# Patient Record
Sex: FEMALE | Race: WHITE | Employment: FULL TIME | URBAN - METROPOLITAN AREA
[De-identification: names, ages, dates, MRNs, and addresses within clinical notes are randomized per-mention and may not be internally consistent; named-entity substitution may affect disease eponyms.]

---

## 2017-01-25 ENCOUNTER — TELEPHONE (OUTPATIENT)
Dept: FAMILY MEDICINE CLINIC | Facility: CLINIC | Age: 57
End: 2017-01-25

## 2017-01-25 NOTE — TELEPHONE ENCOUNTER
Please note/advise. Thank you. Pt wanted to let Dr. Cedrick Martell know that she stopped drinking caffeine as instructed and that the pain to her left breast pain near nipple area is gone.     Pt states that she is still having Intermittent tenderness pain to th

## 2017-01-25 NOTE — TELEPHONE ENCOUNTER
Pt calling to follow up with a nurse or Dr. Amy Partida regarding how she is doing with new diet change. No other information was given,  Please advise.

## 2017-01-26 NOTE — TELEPHONE ENCOUNTER
Pt informed of [facility] note as stated below. Pt verbalized understanding/compliance.  No questions/concerns

## 2017-01-26 NOTE — TELEPHONE ENCOUNTER
Okay, continue off caffeine and call in 1 week with update regarding outer breast pain.   If it persists we will get diagnostic mammogram.

## 2017-02-06 ENCOUNTER — TELEPHONE (OUTPATIENT)
Dept: FAMILY MEDICINE CLINIC | Facility: CLINIC | Age: 57
End: 2017-02-06

## 2017-02-06 DIAGNOSIS — N64.4 BILATERAL MASTODYNIA: Primary | ICD-10-CM

## 2017-02-06 NOTE — TELEPHONE ENCOUNTER
Please let patient know I have entered order for bilateral diagnostic mammogram.  Please obtain this study.

## 2017-02-06 NOTE — TELEPHONE ENCOUNTER
Reason for Call/Chief Complaint: breast pain  Onset: about a month ago  Nursing Assessment/Associated Symptoms:  pt states she had been advised to eliminate caffeine in diet. Pt states she sometimes feels twinges or achiness in both breasts.  Sharp pain in

## 2017-02-07 NOTE — TELEPHONE ENCOUNTER
Pt returned call. Informed her doctor placed a diagnostic mammogram order for her. Pt states she will  order tomorrow as she had mammo done through [facility]. Pt had no further questions at this time.

## 2017-02-21 ENCOUNTER — TELEPHONE (OUTPATIENT)
Dept: FAMILY MEDICINE CLINIC | Facility: CLINIC | Age: 57
End: 2017-02-21

## 2017-04-12 NOTE — TELEPHONE ENCOUNTER
Patient is calling to request refill for medication listed below. Please advise.      Mometasone Furoate (ASMANEX 120 METERED DOSES) 220 MCG/INH Inhalation Aerosol Powder, Breath Activated

## 2017-04-12 NOTE — TELEPHONE ENCOUNTER
Pt requesting Asmanex refill    Med listed as therapy completed in med profile  Last OV 12/29/16    Please advise

## 2017-07-01 PROBLEM — Z83.71 FAMILY HISTORY OF COLONIC POLYPS: Status: ACTIVE | Noted: 2017-07-01

## 2017-07-01 PROBLEM — Z83.719 FAMILY HISTORY OF COLONIC POLYPS: Status: ACTIVE | Noted: 2017-07-01

## 2017-07-06 ENCOUNTER — TELEPHONE (OUTPATIENT)
Dept: FAMILY MEDICINE CLINIC | Facility: CLINIC | Age: 57
End: 2017-07-06

## 2017-07-06 RX ORDER — HYDROCHLOROTHIAZIDE 25 MG/1
25 TABLET ORAL DAILY
Qty: 90 TABLET | Refills: 0 | Status: SHIPPED | OUTPATIENT
Start: 2017-07-06 | End: 2017-10-10 | Stop reason: ALTCHOICE

## 2017-07-06 NOTE — TELEPHONE ENCOUNTER
Please let her know I have sent to pharmacy. Maintain high potassium diet while taking this medication.   Make follow-up appointment with me in 4-6 weeks, bring record of home blood pressures at that time

## 2017-07-06 NOTE — TELEPHONE ENCOUNTER
Actions Requested: requesting hydrochlorothiazide  Problem: high blood pressure  Onset and Timing: last 1 week  Associated Symptoms: neck pain, slight headache.   Aggravating by: stress  Alleviated by:   Triage Note:  Patient stated that for the past 1 week

## 2017-10-10 ENCOUNTER — APPOINTMENT (OUTPATIENT)
Dept: LAB | Age: 57
End: 2017-10-10
Attending: FAMILY MEDICINE
Payer: COMMERCIAL

## 2017-10-10 ENCOUNTER — OFFICE VISIT (OUTPATIENT)
Dept: FAMILY MEDICINE CLINIC | Facility: CLINIC | Age: 57
End: 2017-10-10

## 2017-10-10 VITALS
HEIGHT: 65.39 IN | BODY MASS INDEX: 23.01 KG/M2 | TEMPERATURE: 98 F | SYSTOLIC BLOOD PRESSURE: 136 MMHG | WEIGHT: 139.81 LBS | DIASTOLIC BLOOD PRESSURE: 83 MMHG | RESPIRATION RATE: 16 BRPM | HEART RATE: 72 BPM

## 2017-10-10 DIAGNOSIS — Z12.31 ENCOUNTER FOR SCREENING MAMMOGRAM FOR BREAST CANCER: ICD-10-CM

## 2017-10-10 DIAGNOSIS — Z00.00 ROUTINE PHYSICAL EXAMINATION: Primary | ICD-10-CM

## 2017-10-10 DIAGNOSIS — G47.09 OTHER INSOMNIA: ICD-10-CM

## 2017-10-10 DIAGNOSIS — J45.20 MILD INTERMITTENT ASTHMA WITHOUT COMPLICATION: ICD-10-CM

## 2017-10-10 DIAGNOSIS — I10 HYPERTENSION, BENIGN: ICD-10-CM

## 2017-10-10 DIAGNOSIS — Z00.00 ROUTINE PHYSICAL EXAMINATION: ICD-10-CM

## 2017-10-10 DIAGNOSIS — Z83.71 FAMILY HISTORY OF COLONIC POLYPS: ICD-10-CM

## 2017-10-10 DIAGNOSIS — R92.2 DENSE BREASTS: ICD-10-CM

## 2017-10-10 DIAGNOSIS — E28.39 ESTROGEN DEFICIENCY: ICD-10-CM

## 2017-10-10 PROCEDURE — 36415 COLL VENOUS BLD VENIPUNCTURE: CPT

## 2017-10-10 PROCEDURE — 99396 PREV VISIT EST AGE 40-64: CPT | Performed by: FAMILY MEDICINE

## 2017-10-10 PROCEDURE — 80061 LIPID PANEL: CPT

## 2017-10-10 PROCEDURE — 86803 HEPATITIS C AB TEST: CPT

## 2017-10-10 PROCEDURE — 80048 BASIC METABOLIC PNL TOTAL CA: CPT

## 2017-10-10 RX ORDER — DIAZEPAM 5 MG/1
5 TABLET ORAL EVERY 6 HOURS PRN
Qty: 20 TABLET | Refills: 0 | Status: SHIPPED | OUTPATIENT
Start: 2017-10-10 | End: 2020-05-28

## 2017-10-10 NOTE — PROGRESS NOTES
HPI:    Patient ID: Mirna Blanco is a 61year old female. HPI    Review of Systems   Constitutional: Negative. Respiratory: Negative. Cardiovascular: Negative. Gastrointestinal: Negative. Skin: Negative. Neurological: Negative. done today. Pap smear normal 2015, colonoscopy due 1/2018, discussed mammogram options with history of heterogeneously dense breasts recommend mammogram Ramiro synthesis.   Family history of osteoporosis recommend bone density    Intermittent asthma– symptom

## 2017-11-06 ENCOUNTER — OFFICE VISIT (OUTPATIENT)
Dept: FAMILY MEDICINE CLINIC | Facility: CLINIC | Age: 57
End: 2017-11-06

## 2017-11-06 VITALS
OXYGEN SATURATION: 98 % | SYSTOLIC BLOOD PRESSURE: 149 MMHG | BODY MASS INDEX: 23 KG/M2 | HEART RATE: 63 BPM | TEMPERATURE: 97 F | RESPIRATION RATE: 16 BRPM | DIASTOLIC BLOOD PRESSURE: 83 MMHG | WEIGHT: 140 LBS

## 2017-11-06 DIAGNOSIS — R07.89 OTHER CHEST PAIN: Primary | ICD-10-CM

## 2017-11-06 PROCEDURE — 99213 OFFICE O/P EST LOW 20 MIN: CPT | Performed by: FAMILY MEDICINE

## 2017-11-06 PROCEDURE — 93000 ELECTROCARDIOGRAM COMPLETE: CPT | Performed by: FAMILY MEDICINE

## 2017-11-06 PROCEDURE — 93005 ELECTROCARDIOGRAM TRACING: CPT | Performed by: FAMILY MEDICINE

## 2017-11-06 PROCEDURE — 99212 OFFICE O/P EST SF 10 MIN: CPT | Performed by: FAMILY MEDICINE

## 2017-11-06 NOTE — PROGRESS NOTES
HPI: Charanjit Galvan is a 61year old female who presents for chest pain since this morning. Pt reports she woke up with it this morning. Described as excruciating. She had some shortness of breath. Pain radiated into the back. Has gotten slightly better.  Has no

## 2017-11-25 ENCOUNTER — TELEPHONE (OUTPATIENT)
Dept: OTHER | Age: 57
End: 2017-11-25

## 2017-11-25 ENCOUNTER — HOSPITAL ENCOUNTER (OUTPATIENT)
Age: 57
Discharge: HOME | End: 2017-11-25
Attending: EMERGENCY MEDICINE
Payer: COMMERCIAL

## 2017-11-25 ENCOUNTER — APPOINTMENT (OUTPATIENT)
Dept: GENERAL RADIOLOGY | Age: 57
End: 2017-11-25
Attending: EMERGENCY MEDICINE
Payer: COMMERCIAL

## 2017-11-25 VITALS
SYSTOLIC BLOOD PRESSURE: 140 MMHG | HEIGHT: 66 IN | DIASTOLIC BLOOD PRESSURE: 89 MMHG | OXYGEN SATURATION: 100 % | WEIGHT: 140 LBS | RESPIRATION RATE: 12 BRPM | BODY MASS INDEX: 22.5 KG/M2 | HEART RATE: 63 BPM | TEMPERATURE: 98 F

## 2017-11-25 DIAGNOSIS — S93.601A SPRAIN OF RIGHT FOOT, INITIAL ENCOUNTER: Primary | ICD-10-CM

## 2017-11-25 PROCEDURE — 73630 X-RAY EXAM OF FOOT: CPT | Performed by: EMERGENCY MEDICINE

## 2017-11-25 PROCEDURE — 99203 OFFICE O/P NEW LOW 30 MIN: CPT

## 2017-11-25 PROCEDURE — 99213 OFFICE O/P EST LOW 20 MIN: CPT

## 2017-11-25 NOTE — ED PROVIDER NOTES
Patient Seen in: [facility]    History   Patient presents with:   Foot Injury    Stated Complaint: Rt foot pain    HPI    The patient is a 53-year-old female who presents with complaints of lateral right foot pain which w plantar surface    ED Course   Labs Reviewed - No data to display    ED Course as of Nov 25 1434  ------------------------------------------------------------       [facility]     X-rays negative.   Will treat as a foot sprain and have the patient follow-up with h

## 2017-11-25 NOTE — ED INITIAL ASSESSMENT (HPI)
Approximately one week ago, patient rolled her right ankle while on a stepstool. She has been icing and elevating but today pain appears worse.

## 2017-11-25 NOTE — TELEPHONE ENCOUNTER
Pt stts injured right foot 1 week ago. It got better but now started hurting again after shopping all day yesterday. Slightly swollen. Pain level 4 when she stretches it. No appts available.  I advised pt to ice her foot and  go to urgent care for evaluatio

## 2017-11-27 NOTE — TELEPHONE ENCOUNTER
[facility] FU: Pt seen in [address] [place] [address] 11/25/17 for sprained right foot. No answer at [facility].

## 2017-12-07 ENCOUNTER — OFFICE VISIT (OUTPATIENT)
Dept: FAMILY MEDICINE CLINIC | Facility: CLINIC | Age: 57
End: 2017-12-07

## 2017-12-07 VITALS
WEIGHT: 141.63 LBS | BODY MASS INDEX: 23.31 KG/M2 | SYSTOLIC BLOOD PRESSURE: 123 MMHG | DIASTOLIC BLOOD PRESSURE: 77 MMHG | RESPIRATION RATE: 17 BRPM | HEIGHT: 65.39 IN | HEART RATE: 81 BPM | TEMPERATURE: 98 F

## 2017-12-07 DIAGNOSIS — S96.911A STRAIN OF RIGHT ANKLE, INITIAL ENCOUNTER: Primary | ICD-10-CM

## 2017-12-07 PROCEDURE — 99212 OFFICE O/P EST SF 10 MIN: CPT | Performed by: FAMILY MEDICINE

## 2017-12-07 NOTE — PROGRESS NOTES
HPI:    Patient ID: Li Ball is a 61year old female. Foot Injury    The incident occurred more than 1 week ago. The incident occurred at home. The injury mechanism was an inversion injury. The pain is present in the right foot.  The pain is moderat

## 2018-04-20 ENCOUNTER — HOSPITAL ENCOUNTER (OUTPATIENT)
Dept: MAMMOGRAPHY | Age: 58
Discharge: HOME | End: 2018-04-20
Attending: FAMILY MEDICINE
Payer: COMMERCIAL

## 2018-04-20 DIAGNOSIS — Z12.31 ENCOUNTER FOR SCREENING MAMMOGRAM FOR BREAST CANCER: ICD-10-CM

## 2018-04-20 DIAGNOSIS — R92.2 DENSE BREASTS: ICD-10-CM

## 2018-04-20 PROCEDURE — 77067 SCR MAMMO BI INCL CAD: CPT | Performed by: FAMILY MEDICINE

## 2018-04-20 PROCEDURE — 77063 BREAST TOMOSYNTHESIS BI: CPT | Performed by: FAMILY MEDICINE

## 2018-04-25 ENCOUNTER — APPOINTMENT (OUTPATIENT)
Dept: GENERAL RADIOLOGY | Age: 58
End: 2018-04-25
Attending: FAMILY MEDICINE
Payer: COMMERCIAL

## 2018-04-25 ENCOUNTER — HOSPITAL ENCOUNTER (OUTPATIENT)
Age: 58
Discharge: HOME | End: 2018-04-25
Attending: FAMILY MEDICINE
Payer: COMMERCIAL

## 2018-04-25 ENCOUNTER — NURSE TRIAGE (OUTPATIENT)
Dept: OTHER | Age: 58
End: 2018-04-25

## 2018-04-25 VITALS
OXYGEN SATURATION: 100 % | HEART RATE: 74 BPM | HEIGHT: 66 IN | RESPIRATION RATE: 16 BRPM | DIASTOLIC BLOOD PRESSURE: 83 MMHG | SYSTOLIC BLOOD PRESSURE: 153 MMHG | TEMPERATURE: 97 F | WEIGHT: 140 LBS | BODY MASS INDEX: 22.5 KG/M2

## 2018-04-25 DIAGNOSIS — M79.645 PAIN OF LEFT THUMB: Primary | ICD-10-CM

## 2018-04-25 PROCEDURE — 99214 OFFICE O/P EST MOD 30 MIN: CPT

## 2018-04-25 PROCEDURE — 99213 OFFICE O/P EST LOW 20 MIN: CPT

## 2018-04-25 PROCEDURE — 73140 X-RAY EXAM OF FINGER(S): CPT | Performed by: FAMILY MEDICINE

## 2018-04-25 RX ORDER — IBUPROFEN 600 MG/1
600 TABLET ORAL EVERY 8 HOURS PRN
Qty: 30 TABLET | Refills: 0 | Status: SHIPPED | OUTPATIENT
Start: 2018-04-25 | End: 2018-05-02

## 2018-04-25 NOTE — ED NOTES
Pt discharged home, thumb splint in place, prescription electronically sent to the pharmacy, pt instructed to follow up with her primary md if symptoms do not improve

## 2018-04-25 NOTE — ED INITIAL ASSESSMENT (HPI)
Pt here with complaints of left hand pain, pt states she injured her hand about an hour ago while taking off her shirt she heard a pop and felt \"something moved below her left thumb, pt states it is very hard to move her left thumb and is tender to tough

## 2018-04-25 NOTE — ED PROVIDER NOTES
Patient Seen in: [address]    History   Patient presents with:  Musculoskeletal Problem    Stated Complaint: left hand pain    HPI    54-year-old female presents with acute left thenar eminence and thumb pain.   She repo Bowel sounds are normal.   Musculoskeletal:   Left hand: Point tenderness demonstrated over CMC and MCP joint and palmar aspect of hand with mild edema and no ecchymosis. Pain reproduced with attempted abduction of thumb.   Radial collateral ligament appea tablet (600 mg total) by mouth every 8 (eight) hours as needed for Pain or Fever.   Qty: 30 tablet Refills: 0

## 2018-04-25 NOTE — TELEPHONE ENCOUNTER
Action Requested: Summary for Provider     []  Critical Lab, Recommendations Needed  [] Need Additional Advice  []   FYI    []   Need Orders  [] Need Medications Sent to Pharmacy  []  Other     SUMMARY:  Pt going to UC for injured left thumb.     Pt states

## 2018-04-26 ENCOUNTER — TELEPHONE (OUTPATIENT)
Dept: FAMILY MEDICINE CLINIC | Facility: CLINIC | Age: 58
End: 2018-04-26

## 2018-04-26 NOTE — TELEPHONE ENCOUNTER
Please let her know Renita Kelley MD who is a plastic surgeon with hand specialty would be a good option. [phone].

## 2018-04-26 NOTE — TELEPHONE ENCOUNTER
Contacted patient to follow-up on left thumb injury. Patient states pain still present at rest and with movement. A little swelling present as well. Taking ibuprofen as recommended by urgent care.      States she was referred to Dr. Melanie Harmon for orth

## 2018-04-26 NOTE — TELEPHONE ENCOUNTER
Pt called stating she had made several attempts to contact Dr. Madison Neumann' office to schedule an appt for her hands. Pt states she has never actually talked to someone in the office as either the phone just rings or it goes straight to a .  Pt was told to

## 2018-05-02 ENCOUNTER — OFFICE VISIT (OUTPATIENT)
Dept: SURGERY | Facility: CLINIC | Age: 58
End: 2018-05-02

## 2018-05-02 DIAGNOSIS — M18.12 PRIMARY OSTEOARTHRITIS OF FIRST CARPOMETACARPAL JOINT OF LEFT HAND: ICD-10-CM

## 2018-05-02 DIAGNOSIS — M79.642 PAIN OF LEFT HAND: Primary | ICD-10-CM

## 2018-05-02 PROCEDURE — L3999 UPPER LIMB ORTHOSIS NOS: HCPCS | Performed by: PLASTIC SURGERY

## 2018-05-02 PROCEDURE — 99212 OFFICE O/P EST SF 10 MIN: CPT | Performed by: PLASTIC SURGERY

## 2018-05-02 PROCEDURE — 99243 OFF/OP CNSLTJ NEW/EST LOW 30: CPT | Performed by: PLASTIC SURGERY

## 2018-05-02 NOTE — H&P
Margarita Man is a 61year old female that presents with Patient presents with:  Pain: L thumb and thenar area  .     REFERRED BY:  Madelyn Yang      Pacemaker: No  Latex Allergy: no  Coumadin: No  Plavix: No  Other anticoagulants: No  Cardiac stents: No tablet Rfl: 0   Mometasone Furoate (ASMANEX 120 METERED DOSES) 220 MCG/INH Inhalation Aerosol Powder, Breath Activated Inhale 2 puffs into the lungs daily.  Disp: 2 Inhaler Rfl: 11       Allergies:   No Known Allergies    Past Medical History:   Diagnosis D adenopathy  RESPIRATORY: Inspection - Normal, Effort - Normal  CARDIOVASCULAR: Regular rhythm, No murmurs  ABDOMEN: Inspection - Normal, No abdominal tenderness  NEURO: Memory intact  PSYCH: Oriented to person, place, time, and situation, Appropriate mood

## 2018-05-03 ENCOUNTER — TELEPHONE (OUTPATIENT)
Dept: GASTROENTEROLOGY | Facility: CLINIC | Age: 58
End: 2018-05-03

## 2018-05-03 ENCOUNTER — OFFICE VISIT (OUTPATIENT)
Dept: GASTROENTEROLOGY | Facility: CLINIC | Age: 58
End: 2018-05-03

## 2018-05-03 VITALS
BODY MASS INDEX: 23.7 KG/M2 | WEIGHT: 144 LBS | HEART RATE: 76 BPM | HEIGHT: 65.5 IN | SYSTOLIC BLOOD PRESSURE: 137 MMHG | DIASTOLIC BLOOD PRESSURE: 87 MMHG

## 2018-05-03 DIAGNOSIS — Z83.71 FAMILY HISTORY OF COLONIC POLYPS: Primary | ICD-10-CM

## 2018-05-03 DIAGNOSIS — Z83.71 FAMILY HX COLONIC POLYPS: ICD-10-CM

## 2018-05-03 DIAGNOSIS — Q43.8 TORTUOUS COLON: ICD-10-CM

## 2018-05-03 DIAGNOSIS — Z12.11 SCREEN FOR COLON CANCER: ICD-10-CM

## 2018-05-03 DIAGNOSIS — Z12.11 COLON CANCER SCREENING: Primary | ICD-10-CM

## 2018-05-03 PROCEDURE — 99212 OFFICE O/P EST SF 10 MIN: CPT | Performed by: INTERNAL MEDICINE

## 2018-05-03 PROCEDURE — 99243 OFF/OP CNSLTJ NEW/EST LOW 30: CPT | Performed by: INTERNAL MEDICINE

## 2018-05-03 NOTE — H&P
[address] [facility] [address] Gastroenterology                                                                                                  Clinic History and Physical     Pa Packs/day: 1.00      Years: 10.00        Types: Cigarettes     Quit date: 5/2/1984  Smokeless tobacco: Never Used                      Alcohol use:  Yes              Comment: wine, 2 drinks weekly       Medications (Active prior to today's visit):    China Strong of extremities normal    Labs/Imaging:     Patient's labs and imaging were reviewed and discussed with patient today.       .  ASSESSMENT/PLAN:     HPI:   Eliz Be is a 61year old year-old female with history of family hx of CRC, tortuous colon, HTN,

## 2018-05-03 NOTE — TELEPHONE ENCOUNTER
Scheduled for:  Colonoscopy 72331  Provider Name: Dr Mary Gordon  Date:  Wed 6/27/18  Location:  [facility]  Sedation: MAC  Time:  9:15 am  Prep: new split dose suprep  Meds/Allergies Reconciled?:  NKDA  Diagnosis with codes:  FHCP Z83.71, colon cancer screening Z12.11

## 2018-05-03 NOTE — PATIENT INSTRUCTIONS
1. Schedule colonoscopy with MAC    2.  bowel prep from pharmacy (split suprep)    3. Continue all medications for procedure    4. Read all bowel prep instructions carefully    5.  AVOID seeds, nuts, popcorn, raw fruits and vegetables (cooked is okay

## 2018-06-25 ENCOUNTER — TELEPHONE (OUTPATIENT)
Dept: GASTROENTEROLOGY | Facility: CLINIC | Age: 58
End: 2018-06-25

## 2018-06-25 NOTE — TELEPHONE ENCOUNTER
Pt states that she needs note to excuse her from work on 6/26/18 due to colonoscopy prep. Pt works the midnight shift so she will need note to excuse her for that day. Please send note to pt via My Chart.

## 2018-06-25 NOTE — TELEPHONE ENCOUNTER
Dr. Jose Dooley-    Pt scheduled for CLN on 6/27/18. OK to sent work excuse note for 6/26-6/27? Please advise, thank you.

## 2018-06-27 ENCOUNTER — ANESTHESIA (OUTPATIENT)
Dept: ENDOSCOPY | Facility: HOSPITAL | Age: 58
End: 2018-06-27

## 2018-06-27 ENCOUNTER — HOSPITAL ENCOUNTER (OUTPATIENT)
Facility: HOSPITAL | Age: 58
Setting detail: HOSPITAL OUTPATIENT SURGERY
Discharge: HOME | End: 2018-06-27
Attending: INTERNAL MEDICINE | Admitting: INTERNAL MEDICINE
Payer: COMMERCIAL

## 2018-06-27 ENCOUNTER — ANESTHESIA EVENT (OUTPATIENT)
Dept: ENDOSCOPY | Facility: HOSPITAL | Age: 58
End: 2018-06-27

## 2018-06-27 ENCOUNTER — SURGERY (OUTPATIENT)
Age: 58
End: 2018-06-27

## 2018-06-27 VITALS
BODY MASS INDEX: 23.04 KG/M2 | RESPIRATION RATE: 21 BRPM | DIASTOLIC BLOOD PRESSURE: 69 MMHG | HEIGHT: 65.5 IN | SYSTOLIC BLOOD PRESSURE: 114 MMHG | OXYGEN SATURATION: 96 % | HEART RATE: 65 BPM | WEIGHT: 140 LBS

## 2018-06-27 DIAGNOSIS — Z12.11 COLON CANCER SCREENING: ICD-10-CM

## 2018-06-27 DIAGNOSIS — Z83.71 FAMILY HX COLONIC POLYPS: ICD-10-CM

## 2018-06-27 PROCEDURE — 0DBN8ZX EXCISION OF SIGMOID COLON, VIA NATURAL OR ARTIFICIAL OPENING ENDOSCOPIC, DIAGNOSTIC: ICD-10-PCS | Performed by: INTERNAL MEDICINE

## 2018-06-27 PROCEDURE — 45385 COLONOSCOPY W/LESION REMOVAL: CPT | Performed by: INTERNAL MEDICINE

## 2018-06-27 RX ORDER — LIDOCAINE HYDROCHLORIDE 10 MG/ML
INJECTION, SOLUTION EPIDURAL; INFILTRATION; INTRACAUDAL; PERINEURAL AS NEEDED
Status: DISCONTINUED | OUTPATIENT
Start: 2018-06-27 | End: 2018-06-27 | Stop reason: SURG

## 2018-06-27 RX ORDER — SODIUM CHLORIDE, SODIUM LACTATE, POTASSIUM CHLORIDE, CALCIUM CHLORIDE 600; 310; 30; 20 MG/100ML; MG/100ML; MG/100ML; MG/100ML
INJECTION, SOLUTION INTRAVENOUS CONTINUOUS
Status: DISCONTINUED | OUTPATIENT
Start: 2018-06-27 | End: 2018-06-27

## 2018-06-27 RX ORDER — ONDANSETRON 2 MG/ML
4 INJECTION INTRAMUSCULAR; INTRAVENOUS ONCE AS NEEDED
Status: DISCONTINUED | OUTPATIENT
Start: 2018-06-27 | End: 2018-06-27

## 2018-06-27 RX ORDER — NALOXONE HYDROCHLORIDE 0.4 MG/ML
80 INJECTION, SOLUTION INTRAMUSCULAR; INTRAVENOUS; SUBCUTANEOUS AS NEEDED
Status: DISCONTINUED | OUTPATIENT
Start: 2018-06-27 | End: 2018-06-27

## 2018-06-27 RX ADMIN — SODIUM CHLORIDE, SODIUM LACTATE, POTASSIUM CHLORIDE, CALCIUM CHLORIDE: 600; 310; 30; 20 INJECTION, SOLUTION INTRAVENOUS at 09:02:00

## 2018-06-27 RX ADMIN — LIDOCAINE HYDROCHLORIDE 50 MG: 10 INJECTION, SOLUTION EPIDURAL; INFILTRATION; INTRACAUDAL; PERINEURAL at 09:04:00

## 2018-06-27 RX ADMIN — SODIUM CHLORIDE, SODIUM LACTATE, POTASSIUM CHLORIDE, CALCIUM CHLORIDE: 600; 310; 30; 20 INJECTION, SOLUTION INTRAVENOUS at 09:32:00

## 2018-06-27 NOTE — ANESTHESIA POSTPROCEDURE EVALUATION
Patient:  Connor Hollins    Procedure Summary     Date:  06/27/18 Room / Location:  [facility] ENDOSCOPY 03 / [facility] ENDOSCOPY    Anesthesia Start:  0902 Anesthesia Stop:  6300    Procedure:  COLONOSCOPY (N/A ) Diagnosis:       Family hx colonic polyps      Colon ca

## 2018-06-27 NOTE — OPERATIVE REPORT
COLONOSCOPY REPORT    Janeen Weinberg    RIKKI 1960 Age 61year old   PCP Lucy Sanchez MD Endoscopist Lucas Garcia MD     Date of procedure: 18    Procedure: Colonoscopy w/cold snare polypectomy    Pre-operative diagnosis: Screening, colonic mucosa throughout the colon showed normal vascular pattern, without evidence of angioectasias or inflammation. 6. ANANT: normal rectal tone, no masses palpated. Impression:   · One small colon polyp removed. · Pan-colonic diverticulosis.   ·

## 2018-06-27 NOTE — ANESTHESIA PREPROCEDURE EVALUATION
Anesthesia PreOp Note    HPI:     Kelley Sanchez is a 61year old female who presents for preoperative consultation requested by: Kathy Hinojosa MD    Date of Surgery: 6/27/2018    Procedure(s):  COLONOSCOPY  Indication: Family hx colonic polyps, Colon Surgery Father      CABG x4   • Polyps Father      colon polyps   • Prostate Cancer Father    • Cancer Father      prostate cancer   • Thyroid Disorder Mother      hypothyroid   • Cancer Mother      pancreatic cancer   • Colon Cancer Paternal Grandmother Neuro/Psych - negative ROS     GI/Hepatic/Renal - negative ROS     Endo/Other - negative ROS   Abdominal  - normal exam             Anesthesia Plan:   ASA:  1  Plan:   MAC  Post-op Pain Management: IV analgesics  Informed Consent Plan and Risks Discussed W

## 2018-06-27 NOTE — H&P
History & Physical Examination    Patient Name: Trent Benson  MRN: [id number]  [facility]: [id number]  YOB: 1960    Diagnosis: screening, family hx of CRC      Prescriptions Prior to Admission:  diazepam 5 MG Oral Tab Take 1 tablet (5 mg total) HEART [ Kalin Vick [ Liam Nageotte [ Keon Ventura [ X]      I have discussed the risks and benefits and alternatives of the procedure with the patient/family. They understand and agree to proceed with plan of care.    I have reviewed the History an

## 2018-07-02 ENCOUNTER — TELEPHONE (OUTPATIENT)
Dept: GASTROENTEROLOGY | Facility: CLINIC | Age: 58
End: 2018-07-02

## 2018-07-02 NOTE — TELEPHONE ENCOUNTER
Message   Received: 2 days ago   Message Contents   Catherine Sandoval MD  [facility] Clinical Staff             GI staff: please place recall for colonoscopy in 5 years      Results letter mailed to patient and colon recall entered for 5 years.

## 2018-10-16 ENCOUNTER — OFFICE VISIT (OUTPATIENT)
Dept: FAMILY MEDICINE CLINIC | Facility: CLINIC | Age: 58
End: 2018-10-16
Payer: COMMERCIAL

## 2018-10-16 VITALS
DIASTOLIC BLOOD PRESSURE: 75 MMHG | SYSTOLIC BLOOD PRESSURE: 111 MMHG | BODY MASS INDEX: 23.37 KG/M2 | TEMPERATURE: 98 F | WEIGHT: 142 LBS | RESPIRATION RATE: 16 BRPM | HEART RATE: 90 BPM | HEIGHT: 65.5 IN

## 2018-10-16 DIAGNOSIS — I10 HYPERTENSION, BENIGN: ICD-10-CM

## 2018-10-16 DIAGNOSIS — N94.9 VAGINAL DISCOMFORT: ICD-10-CM

## 2018-10-16 DIAGNOSIS — Z01.419 ENCOUNTER FOR GYNECOLOGICAL EXAMINATION WITHOUT ABNORMAL FINDING: Primary | ICD-10-CM

## 2018-10-16 DIAGNOSIS — M26.659 TMJ ARTHROPATHY: ICD-10-CM

## 2018-10-16 DIAGNOSIS — Z12.31 ENCOUNTER FOR SCREENING MAMMOGRAM FOR BREAST CANCER: ICD-10-CM

## 2018-10-16 DIAGNOSIS — J45.20 MILD INTERMITTENT ASTHMA WITHOUT COMPLICATION: ICD-10-CM

## 2018-10-16 DIAGNOSIS — L82.1 SEBORRHEIC KERATOSIS: ICD-10-CM

## 2018-10-16 PROCEDURE — 99396 PREV VISIT EST AGE 40-64: CPT | Performed by: FAMILY MEDICINE

## 2018-10-16 NOTE — PROGRESS NOTES
HPI:    Patient ID: Bowen Curry is a 61year old female. HPI    Review of Systems   Constitutional: Negative. Respiratory: Negative. Cardiovascular: Negative. Gastrointestinal: Negative. Genitourinary: Positive for genital sores.    Skin: work.    Mild intermittent asthma–controlled on current medication    TMJ arthropathy–we will consult with dentist.    Vaginal discomfort–intermittent sores, patient wonders if possible herpes.   Will return when having symptoms    Hypertension– blood press

## 2018-12-28 ENCOUNTER — OFFICE VISIT (OUTPATIENT)
Dept: FAMILY MEDICINE CLINIC | Facility: CLINIC | Age: 58
End: 2018-12-28
Payer: COMMERCIAL

## 2018-12-28 ENCOUNTER — NURSE TRIAGE (OUTPATIENT)
Dept: OTHER | Age: 58
End: 2018-12-28

## 2018-12-28 VITALS
WEIGHT: 140 LBS | HEIGHT: 65 IN | BODY MASS INDEX: 23.32 KG/M2 | DIASTOLIC BLOOD PRESSURE: 70 MMHG | OXYGEN SATURATION: 97 % | TEMPERATURE: 98 F | RESPIRATION RATE: 16 BRPM | HEART RATE: 82 BPM | SYSTOLIC BLOOD PRESSURE: 114 MMHG

## 2018-12-28 DIAGNOSIS — J06.9 VIRAL UPPER RESPIRATORY TRACT INFECTION: ICD-10-CM

## 2018-12-28 DIAGNOSIS — J02.9 ACUTE PHARYNGITIS, UNSPECIFIED ETIOLOGY: Primary | ICD-10-CM

## 2018-12-28 PROCEDURE — 99213 OFFICE O/P EST LOW 20 MIN: CPT | Performed by: NURSE PRACTITIONER

## 2018-12-28 PROCEDURE — 87880 STREP A ASSAY W/OPTIC: CPT | Performed by: NURSE PRACTITIONER

## 2018-12-28 NOTE — PATIENT INSTRUCTIONS
Ibuprofen as packet insert   Rest and fluids  May use chloraseptic stray    Zyrtec or Claritin as packet insert   May try Sambucol black berry as packet insert      Follow-up if not improving          When You Have a Sore Throat    A sore throat can be gayathri problems? · Do you have bad breath? · Do you cough up bad-tasting mucus? Physical exam  During the exam, your healthcare provider checks your ears, nose, and throat for problems.  He or she also checks for swelling in the neck, and may listen to your roger And antibiotics don’t treat viral illness. In fact, using antibiotics when they’re not needed may produce bacteria that are harder to kill. Your healthcare provider will prescribe antibiotics only if he or she thinks they are likely to help.   If antibiotic coughing and sneezing. It may also be spread by direct contact (touching the sick person and then touching your own eyes, nose, or mouth). Frequent handwashing will decrease risk of spread.  Most viral illnesses go away within 7 to 10 days with rest and sim provider  Call 911  Call 911 if any of these occur:  · Chest pain, shortness of breath, wheezing, or difficulty breathing  · Coughing up blood  · Inability to swallow due to throat pain  Date Last Reviewed: 9/13/2015  © 3241-5166 [employer].

## 2018-12-28 NOTE — TELEPHONE ENCOUNTER
Action Requested: Summary for Provider     []  Critical Lab, Recommendations Needed  [] Need Additional Advice  []   FYI    []   Need Orders  [] Need Medications Sent to Pharmacy  []  Other     SUMMARY: Patient requesting appt today for sore throat pain x

## 2018-12-28 NOTE — PROGRESS NOTES
CHIEF COMPLAINT:   Patient presents with:  Sore Throat: x 6 days        HPI:   Eric Lea is a 60year old female presents to clinic with symptoms of sore throat. Patient has had for 6 days. Symptoms have been consitent since onset.     Patient reports HEAD: atraumatic, normocephalic  EYES: conjunctiva clear  EARS: TM's clear, non-injected, no bulging, retraction, or fluid  NOSE: nostrils patent, clear nasal mucus, nasal mucosa pink and noninflamed  THROAT: oral mucosa pink, moist. Posterior pharynx mild May try Sambucol black berry as packet insert      Follow-up if not improving          When You Have a Sore Throat    A sore throat can be painful. There are many reasons why you may have a sore throat.  Your healthcare provider will work with you to find t During the exam, your healthcare provider checks your ears, nose, and throat for problems.  He or she also checks for swelling in the neck, and may listen to your chest.  Possible tests  Other tests your healthcare provider may perform include:  · A throat If your sore throat is due to a bacterial infection, antibiotics may speed healing and prevent complications.  Although group A streptococcus (\"strep throat\" or GAS) is the major treatable infection for a sore throat, GAS causes only 5% to 15% of sore thr © 7176-4519 [employer]. [address] [facility] [address] [place]. All rights reserved. This information is not intended as a substitute for professional medical care. Always follow your healthcare professional's instructions.         Viral U · Over-the-counter cold medicines will not shorten the length of time you’re sick, but they may be helpful for the following symptoms: cough, sore throat, and nasal and sinus congestion.  (Note: Do not use decongestants if you have high blood pressure.)  Fo

## 2019-01-03 ENCOUNTER — NURSE TRIAGE (OUTPATIENT)
Dept: OTHER | Age: 59
End: 2019-01-03

## 2019-01-03 ENCOUNTER — OFFICE VISIT (OUTPATIENT)
Dept: FAMILY MEDICINE CLINIC | Facility: CLINIC | Age: 59
End: 2019-01-03
Payer: COMMERCIAL

## 2019-01-03 VITALS
SYSTOLIC BLOOD PRESSURE: 126 MMHG | HEIGHT: 65 IN | RESPIRATION RATE: 17 BRPM | TEMPERATURE: 98 F | DIASTOLIC BLOOD PRESSURE: 89 MMHG | HEART RATE: 59 BPM | BODY MASS INDEX: 23 KG/M2

## 2019-01-03 DIAGNOSIS — G51.0 BELL'S PALSY: Primary | ICD-10-CM

## 2019-01-03 PROCEDURE — 99212 OFFICE O/P EST SF 10 MIN: CPT | Performed by: FAMILY MEDICINE

## 2019-01-03 PROCEDURE — 99214 OFFICE O/P EST MOD 30 MIN: CPT | Performed by: FAMILY MEDICINE

## 2019-01-03 RX ORDER — PREDNISONE 10 MG/1
TABLET ORAL
Qty: 35 TABLET | Refills: 0 | Status: SHIPPED | OUTPATIENT
Start: 2019-01-03 | End: 2019-08-02 | Stop reason: ALTCHOICE

## 2019-01-03 RX ORDER — VALACYCLOVIR HYDROCHLORIDE 1 G/1
1 TABLET, FILM COATED ORAL 3 TIMES DAILY
Qty: 21 TABLET | Refills: 0 | Status: SHIPPED | OUTPATIENT
Start: 2019-01-03 | End: 2019-01-10

## 2019-01-03 RX ORDER — PREDNISONE 50 MG/1
50 TABLET ORAL DAILY
Qty: 7 TABLET | Refills: 0 | Status: SHIPPED | OUTPATIENT
Start: 2019-01-03 | End: 2019-01-03 | Stop reason: CLARIF

## 2019-01-03 NOTE — TELEPHONE ENCOUNTER
Action Requested: Summary for Provider     []  Critical Lab, Recommendations Needed  [] Need Additional Advice  []   FYI    []   Need Orders  [] Need Medications Sent to Pharmacy  []  Other     SUMMARY:Pt requesting Appt today only at OP-refuse to go to ot

## 2019-01-03 NOTE — TELEPHONE ENCOUNTER
Spoke to patient and she stated she can be at [facility] office in 30-45 minutes. Patient scheduled to see Dr. Cynthia Caro today at 1:45 p.m.

## 2019-01-04 NOTE — PROGRESS NOTES
HPI:    Bowen Curry is a 60year old female presents to clinic with symptoms that started last night at 2 am. States that she was watching TV with her  when the right side of her face felt numb.  She went to the mirror and noticed that she coul Used    Alcohol use: Yes      Comment: wine, 2 drinks weekly    Drug use: No       Medications (Active prior to today's visit):    Current Outpatient Medications:  ValACYclovir HCl 1 G Oral Tab Take 1 tablet (1,000 mg total) by mouth 3 (three) times daily patient paged on call MD, Dr. Thi Walker last night to discuss symptoms. She did disclose to her that she had several alcoholic beverages prior to this happening, did not mention this at visit.  Patient works as an  and went through the [employer]

## 2019-01-10 ENCOUNTER — OFFICE VISIT (OUTPATIENT)
Dept: FAMILY MEDICINE CLINIC | Facility: CLINIC | Age: 59
End: 2019-01-10
Payer: COMMERCIAL

## 2019-01-10 VITALS
WEIGHT: 142 LBS | SYSTOLIC BLOOD PRESSURE: 122 MMHG | RESPIRATION RATE: 18 BRPM | TEMPERATURE: 98 F | HEART RATE: 76 BPM | BODY MASS INDEX: 24 KG/M2 | DIASTOLIC BLOOD PRESSURE: 80 MMHG

## 2019-01-10 DIAGNOSIS — G51.0 BELL'S PALSY: Primary | ICD-10-CM

## 2019-01-10 DIAGNOSIS — M76.32 ILIOTIBIAL BAND SYNDROME, LEFT LEG: ICD-10-CM

## 2019-01-10 PROCEDURE — 99212 OFFICE O/P EST SF 10 MIN: CPT | Performed by: FAMILY MEDICINE

## 2019-01-10 PROCEDURE — 99213 OFFICE O/P EST LOW 20 MIN: CPT | Performed by: FAMILY MEDICINE

## 2019-01-10 NOTE — PROGRESS NOTES
HPI:    Patient ID: Ilana Wellington is a 60year old female. Bell's Palsy   This is a new problem. The current episode started in the past 7 days. The problem occurs daily. The problem has been rapidly improving.  Pertinent negatives include no fever, ra palsy–patient had onset of right face weakness 1 week ago. No history of Lyme disease or tick bite. She was seen by Dr. Mariam Hay. Treated with Valtrex and prednisone. Symptoms much improved.   On exam today continued slight weakness of right eyelid, oth

## 2019-03-18 RX ORDER — MOMETASONE FUROATE 220 UG/1
INHALANT RESPIRATORY (INHALATION)
Qty: 2 INHALER | Refills: 11 | Status: SHIPPED | OUTPATIENT
Start: 2019-03-18 | End: 2020-03-14

## 2019-05-29 ENCOUNTER — NURSE TRIAGE (OUTPATIENT)
Dept: FAMILY MEDICINE CLINIC | Facility: CLINIC | Age: 59
End: 2019-05-29

## 2019-05-29 ENCOUNTER — OFFICE VISIT (OUTPATIENT)
Dept: FAMILY MEDICINE CLINIC | Facility: CLINIC | Age: 59
End: 2019-05-29
Payer: COMMERCIAL

## 2019-05-29 VITALS
HEIGHT: 65.5 IN | RESPIRATION RATE: 15 BRPM | BODY MASS INDEX: 22.22 KG/M2 | DIASTOLIC BLOOD PRESSURE: 76 MMHG | WEIGHT: 135 LBS | TEMPERATURE: 98 F | OXYGEN SATURATION: 98 % | HEART RATE: 74 BPM | SYSTOLIC BLOOD PRESSURE: 125 MMHG

## 2019-05-29 DIAGNOSIS — R30.0 DYSURIA: Primary | ICD-10-CM

## 2019-05-29 PROCEDURE — 87086 URINE CULTURE/COLONY COUNT: CPT | Performed by: PHYSICIAN ASSISTANT

## 2019-05-29 PROCEDURE — 81003 URINALYSIS AUTO W/O SCOPE: CPT | Performed by: PHYSICIAN ASSISTANT

## 2019-05-29 PROCEDURE — 99213 OFFICE O/P EST LOW 20 MIN: CPT | Performed by: PHYSICIAN ASSISTANT

## 2019-05-29 RX ORDER — NITROFURANTOIN 25; 75 MG/1; MG/1
100 CAPSULE ORAL 2 TIMES DAILY
Qty: 10 CAPSULE | Refills: 0 | Status: SHIPPED | OUTPATIENT
Start: 2019-05-29 | End: 2019-06-03

## 2019-05-29 RX ORDER — PHENAZOPYRIDINE HYDROCHLORIDE 200 MG/1
200 TABLET, FILM COATED ORAL 3 TIMES DAILY PRN
Qty: 6 TABLET | Refills: 0 | Status: SHIPPED | OUTPATIENT
Start: 2019-05-29 | End: 2019-05-31

## 2019-05-29 NOTE — TELEPHONE ENCOUNTER
Action Requested: Summary for Provider     []  Critical Lab, Recommendations Needed  [] Need Additional Advice  []   FYI    []   Need Orders  [] Need Medications Sent to Pharmacy  []  Other     SUMMARY: Appt scheduled for tomorrow 1:45pm with Dr COUGHLIN [facility] for claudia

## 2019-05-29 NOTE — PROGRESS NOTES
CHIEF COMPLAINT:   Patient presents with:  UTI    HPI:   Eden Rey is a 60year old female who presents with symptoms of UTI. Complaining of urinary frequency, urgency, dysuria for the last 6 days. Symptoms have been constant since onset.   Treatmen palpitations  LUNGS: denies shortness of breath, cough, or wheezing  GI: See HPI. No N/V/C/D. : See HPI. NEURO: no headaches.     EXAM:   /76   Pulse 74   Temp 98.2 °F (36.8 °C) (Oral)   Resp 15   Ht 65.5\"   Wt 135 lb   SpO2 98%   BMI 22.12 kg/m Visit:  Requested Prescriptions     Signed Prescriptions Disp Refills   • Nitrofurantoin Monohyd Macro 100 MG Oral Cap 10 capsule 0     Sig: Take 1 capsule (100 mg total) by mouth 2 (two) times daily for 5 days.    • Phenazopyridine HCl 200 MG Oral Tab 6 ta

## 2019-05-29 NOTE — PATIENT INSTRUCTIONS
Please follow up with your PCP if no improvement within 5-7 days. Go directly to the ER for any acute worsening of symptoms. · We will send out urine culture and contact you with the results in 2-3 days  ·  Complete full course of antibiotics.   · Over th

## 2019-05-30 ENCOUNTER — TELEPHONE (OUTPATIENT)
Dept: FAMILY MEDICINE CLINIC | Facility: CLINIC | Age: 59
End: 2019-05-30

## 2019-05-30 NOTE — TELEPHONE ENCOUNTER
Spoke with patient, gave results of lab test. Pt advised to continue abx as ordered, and maintain hydration with water, avoiding alcohol, caffeine and carbonated beverages.

## 2019-08-01 ENCOUNTER — NURSE TRIAGE (OUTPATIENT)
Dept: FAMILY MEDICINE CLINIC | Facility: CLINIC | Age: 59
End: 2019-08-01

## 2019-08-01 RX ORDER — HYDROCHLOROTHIAZIDE 25 MG/1
25 TABLET ORAL DAILY
Qty: 90 TABLET | Refills: 0 | Status: SHIPPED | OUTPATIENT
Start: 2019-08-01 | End: 2019-08-02 | Stop reason: ALTCHOICE

## 2019-08-01 NOTE — TELEPHONE ENCOUNTER
Pt informed of [facility]'s response below and pt agrees. Pt reports she rechecked her BP a little while ago and it was ~128/70s. Will discuss further at tomorrow appt. Denies further questions/concerns at this time.

## 2019-08-01 NOTE — TELEPHONE ENCOUNTER
Please let her know I do agree with restarting hydrochlorothiazide. Rx sent to pharmacy. We will discuss further tomorrow. Please remind her to always sit for at least 5 minutes before blood pressure check.

## 2019-08-01 NOTE — TELEPHONE ENCOUNTER
Patient returned call, had appt today with PENNY PRASAD to evaluate BP, reports recently high. Has cancelled this appt and is preferring to see  [facility] only regarding her BP, rescheduled appt for tomorrow 1:45pm with  [facility].  Has not rechecked her BP today, repor

## 2019-08-01 NOTE — TELEPHONE ENCOUNTER
Action Requested: Summary for Provider     []  Critical Lab, Recommendations Needed  [] Need Additional Advice  []   FYI    []   Need Orders  [] Need Medications Sent to Pharmacy  []  Other     SUMMARY: Spoke with the patient who reports she feels like her

## 2019-08-02 ENCOUNTER — OFFICE VISIT (OUTPATIENT)
Dept: FAMILY MEDICINE CLINIC | Facility: CLINIC | Age: 59
End: 2019-08-02
Payer: COMMERCIAL

## 2019-08-02 VITALS
HEART RATE: 70 BPM | DIASTOLIC BLOOD PRESSURE: 84 MMHG | RESPIRATION RATE: 18 BRPM | WEIGHT: 135.81 LBS | BODY MASS INDEX: 22 KG/M2 | SYSTOLIC BLOOD PRESSURE: 122 MMHG | TEMPERATURE: 98 F

## 2019-08-02 DIAGNOSIS — M54.2 CHRONIC NECK PAIN: ICD-10-CM

## 2019-08-02 DIAGNOSIS — G89.29 CHRONIC NECK PAIN: ICD-10-CM

## 2019-08-02 DIAGNOSIS — I10 HYPERTENSION, BENIGN: Primary | ICD-10-CM

## 2019-08-02 PROCEDURE — 99213 OFFICE O/P EST LOW 20 MIN: CPT | Performed by: FAMILY MEDICINE

## 2019-08-02 RX ORDER — HYDROCHLOROTHIAZIDE 12.5 MG/1
12.5 TABLET ORAL DAILY
Qty: 90 TABLET | Refills: 3 | Status: SHIPPED | OUTPATIENT
Start: 2019-08-02 | End: 2020-07-27

## 2019-08-02 NOTE — PROGRESS NOTES
HPI:    Patient ID: Melissa Kimball is a 60year old female. Blood Pressure   This is a chronic problem. The current episode started more than 1 year ago. The problem has been gradually worsening. Associated symptoms include headaches and neck pain.  Per [facility].    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 Referred for physical therapy. Discussed ergonomics. Follow-up for routine physical in 3 months.     No orders of the defined type

## 2020-01-16 ENCOUNTER — NURSE TRIAGE (OUTPATIENT)
Dept: FAMILY MEDICINE CLINIC | Facility: CLINIC | Age: 60
End: 2020-01-16

## 2020-01-16 NOTE — TELEPHONE ENCOUNTER
Action Requested: Summary for Provider     []  Critical Lab, Recommendations Needed  [] Need Additional Advice  []   FYI    []   Need Orders  [] Need Medications Sent to Pharmacy  []  Other     SUMMARY: Patient c/o sinus congestion, sore throat, right ear

## 2020-01-16 NOTE — TELEPHONE ENCOUNTER
Call Details: pt states that she has a sore throat and cough. Pt would only like to be seen in the [facility] location. Call transferred to RN Triage ([id number]).

## 2020-02-28 ENCOUNTER — TELEPHONE (OUTPATIENT)
Dept: FAMILY MEDICINE CLINIC | Facility: CLINIC | Age: 60
End: 2020-02-28

## 2020-02-28 DIAGNOSIS — Z12.39 BREAST CANCER SCREENING: Primary | ICD-10-CM

## 2020-02-29 NOTE — TELEPHONE ENCOUNTER
Pt contacted and informed of order. Central number given. Will mail copy of order to home. No further action needed.

## 2020-03-14 NOTE — TELEPHONE ENCOUNTER
Patient calling about RX for Asmanex to be sent to mailed order pharmacy     Med pended for your review / approval

## 2020-03-17 ENCOUNTER — TELEPHONE (OUTPATIENT)
Dept: FAMILY MEDICINE CLINIC | Facility: CLINIC | Age: 60
End: 2020-03-17

## 2020-03-17 NOTE — TELEPHONE ENCOUNTER
Please let patient know I have sent new Rx to pharmacy for Flovent. Should be equally effective. Try for 1 month and let us know if any decline in asthma control.

## 2020-03-17 NOTE — TELEPHONE ENCOUNTER
Prior authorization has been denied for Mometasone Furoate (ASMANEX, 60 METERED DOSES,). Patients plan states medication is not covered due to not meeting criteria. Patient must try alternatives.    ARNUITY ELLIPTA, FLOVENT DISKUS, FLOVENT HFA, PULMICOR

## 2020-03-18 NOTE — TELEPHONE ENCOUNTER
RN pls call pt and relay MD message, thanks. Also [employer] message with MD recommendation sent to pt.

## 2020-03-31 ENCOUNTER — TELEPHONE (OUTPATIENT)
Dept: FAMILY MEDICINE CLINIC | Facility: CLINIC | Age: 60
End: 2020-03-31

## 2020-03-31 NOTE — TELEPHONE ENCOUNTER
Pt stt she uses an inhaler and the insurance will no longer cover it. Pt wants to know if the Dr will be able to advocate her receiving her current inhaler.  Please advise

## 2020-04-01 NOTE — TELEPHONE ENCOUNTER
Agree with patient request, she has not tolerated Flovent, causes cough. Please redirect message to initiate prior authorization for Asmanex.

## 2020-04-02 NOTE — TELEPHONE ENCOUNTER
Dr. Alba Mayo, prior authorization was denied on 03/17/2020. If you would like to appeal, please generate letter with reason, past medications tried and diagnosis.

## 2020-05-28 RX ORDER — DIAZEPAM 5 MG/1
5 TABLET ORAL EVERY 6 HOURS PRN
Qty: 20 TABLET | Refills: 0 | Status: SHIPPED | OUTPATIENT
Start: 2020-05-28 | End: 2020-09-26

## 2020-06-02 ENCOUNTER — HOSPITAL ENCOUNTER (OUTPATIENT)
Dept: MAMMOGRAPHY | Age: 60
Discharge: HOME | End: 2020-06-02
Attending: FAMILY MEDICINE
Payer: COMMERCIAL

## 2020-06-02 DIAGNOSIS — Z12.39 BREAST CANCER SCREENING: ICD-10-CM

## 2020-06-02 PROCEDURE — 77067 SCR MAMMO BI INCL CAD: CPT | Performed by: FAMILY MEDICINE

## 2020-06-02 PROCEDURE — 77063 BREAST TOMOSYNTHESIS BI: CPT | Performed by: FAMILY MEDICINE

## 2020-07-22 ENCOUNTER — NURSE TRIAGE (OUTPATIENT)
Dept: FAMILY MEDICINE CLINIC | Facility: CLINIC | Age: 60
End: 2020-07-22

## 2020-07-22 NOTE — TELEPHONE ENCOUNTER
Action Requested: Summary for Provider     []  Critical Lab, Recommendations Needed  [] Need Additional Advice  []   FYI    []   Need Orders  [] Need Medications Sent to Pharmacy  []  Other     SUMMARY: Per protocol advised : OV, due to symptoms needs virt

## 2020-07-23 ENCOUNTER — LAB ENCOUNTER (OUTPATIENT)
Dept: LAB | Facility: HOSPITAL | Age: 60
End: 2020-07-23
Attending: FAMILY MEDICINE
Payer: COMMERCIAL

## 2020-07-23 ENCOUNTER — TELEMEDICINE (OUTPATIENT)
Dept: FAMILY MEDICINE CLINIC | Facility: CLINIC | Age: 60
End: 2020-07-23
Payer: COMMERCIAL

## 2020-07-23 DIAGNOSIS — R50.9 LOW GRADE FEVER: ICD-10-CM

## 2020-07-23 DIAGNOSIS — J02.9 SORE THROAT: ICD-10-CM

## 2020-07-23 DIAGNOSIS — R50.9 LOW GRADE FEVER: Primary | ICD-10-CM

## 2020-07-23 PROCEDURE — 99213 OFFICE O/P EST LOW 20 MIN: CPT | Performed by: FAMILY MEDICINE

## 2020-07-23 NOTE — PROGRESS NOTES
HPI:    Patient ID: Vipin Charlton is a 60year old female. Doximity video this visit is conducted using Telemedicine with live, interactive video and audio.     Patient has been referred to the [facility] website at [email or web address] to review the yearisteo Also daily headache. Brother recently  of complications from LEUWF-27, he lived in a group home. She has frequent contact with her 59-year-old father. She denies cough, dyspnea, loss of taste or smell.   She has been on vacation past 2 weeks, due to

## 2020-07-24 LAB — SARS-COV-2 RNA RESP QL NAA+PROBE: NOT DETECTED

## 2020-08-12 ENCOUNTER — TELEPHONE (OUTPATIENT)
Dept: FAMILY MEDICINE CLINIC | Facility: CLINIC | Age: 60
End: 2020-08-12

## 2020-08-12 ENCOUNTER — OFFICE VISIT (OUTPATIENT)
Dept: FAMILY MEDICINE CLINIC | Facility: CLINIC | Age: 60
End: 2020-08-12
Payer: COMMERCIAL

## 2020-08-12 ENCOUNTER — HOSPITAL ENCOUNTER (OUTPATIENT)
Dept: GENERAL RADIOLOGY | Age: 60
Discharge: HOME | End: 2020-08-12
Attending: FAMILY MEDICINE
Payer: COMMERCIAL

## 2020-08-12 VITALS
HEART RATE: 73 BPM | TEMPERATURE: 98 F | DIASTOLIC BLOOD PRESSURE: 78 MMHG | BODY MASS INDEX: 23 KG/M2 | RESPIRATION RATE: 16 BRPM | WEIGHT: 137.63 LBS | SYSTOLIC BLOOD PRESSURE: 152 MMHG

## 2020-08-12 DIAGNOSIS — M25.562 LEFT KNEE PAIN, UNSPECIFIED CHRONICITY: ICD-10-CM

## 2020-08-12 DIAGNOSIS — M25.562 LEFT KNEE PAIN, UNSPECIFIED CHRONICITY: Primary | ICD-10-CM

## 2020-08-12 PROCEDURE — 3077F SYST BP >= 140 MM HG: CPT | Performed by: FAMILY MEDICINE

## 2020-08-12 PROCEDURE — 73562 X-RAY EXAM OF KNEE 3: CPT | Performed by: FAMILY MEDICINE

## 2020-08-12 PROCEDURE — 3078F DIAST BP <80 MM HG: CPT | Performed by: FAMILY MEDICINE

## 2020-08-12 PROCEDURE — 99213 OFFICE O/P EST LOW 20 MIN: CPT | Performed by: FAMILY MEDICINE

## 2020-08-12 RX ORDER — MELOXICAM 15 MG/1
15 TABLET ORAL DAILY
Qty: 30 TABLET | Refills: 1 | Status: SHIPPED | OUTPATIENT
Start: 2020-08-12 | End: 2020-10-20 | Stop reason: ALTCHOICE

## 2020-08-12 RX ORDER — HYDROCHLOROTHIAZIDE 12.5 MG/1
12.5 TABLET ORAL DAILY
COMMUNITY
End: 2020-10-20 | Stop reason: ALTCHOICE

## 2020-08-12 NOTE — PROGRESS NOTES
HPI:    Patient ID: Lovely Mckeon is a 60year old female. Knee Pain    The pain is present in the left knee. This is a new problem. The current episode started 1 to 4 weeks ago. There has been a history of trauma. The problem occurs daily.  The proble stiffness with getting up. Pain primarily anterior proximal medial aspect. Suspect meniscal injury. Start with x-ray and physical therapy. Meloxicam as directed reviewed instructions and side effects of medication.   If not improved in 1 month consider

## 2020-08-12 NOTE — TELEPHONE ENCOUNTER
Patient states in February she was exercising and hurt her left knee. In July she attempted to jump over a fence which she didn't completely clear and felt as though she twisted her left leg when landing.   States she is in pain and would like this looked

## 2020-08-18 ENCOUNTER — OFFICE VISIT (OUTPATIENT)
Dept: FAMILY MEDICINE CLINIC | Facility: CLINIC | Age: 60
End: 2020-08-18
Payer: COMMERCIAL

## 2020-08-18 ENCOUNTER — APPOINTMENT (OUTPATIENT)
Dept: LAB | Age: 60
End: 2020-08-18
Attending: FAMILY MEDICINE
Payer: COMMERCIAL

## 2020-08-18 VITALS
HEART RATE: 74 BPM | SYSTOLIC BLOOD PRESSURE: 116 MMHG | TEMPERATURE: 97 F | WEIGHT: 139 LBS | DIASTOLIC BLOOD PRESSURE: 78 MMHG | RESPIRATION RATE: 18 BRPM | BODY MASS INDEX: 22.61 KG/M2 | HEIGHT: 65.75 IN

## 2020-08-18 DIAGNOSIS — J45.20 MILD INTERMITTENT ASTHMA WITHOUT COMPLICATION: ICD-10-CM

## 2020-08-18 DIAGNOSIS — Z00.00 ROUTINE PHYSICAL EXAMINATION: Primary | ICD-10-CM

## 2020-08-18 DIAGNOSIS — Z00.00 ROUTINE PHYSICAL EXAMINATION: ICD-10-CM

## 2020-08-18 DIAGNOSIS — Z83.71 FAMILY HISTORY OF COLONIC POLYPS: ICD-10-CM

## 2020-08-18 DIAGNOSIS — K62.89 ANAL PAIN: ICD-10-CM

## 2020-08-18 DIAGNOSIS — I10 HYPERTENSION, BENIGN: ICD-10-CM

## 2020-08-18 LAB
ANION GAP SERPL CALC-SCNC: 6 MMOL/L (ref 0–18)
BUN BLD-MCNC: 19 MG/DL (ref 7–18)
BUN/CREAT SERPL: 21.1 (ref 10–20)
CALCIUM BLD-MCNC: 9 MG/DL (ref 8.5–10.1)
CHLORIDE SERPL-SCNC: 107 MMOL/L (ref 98–112)
CHOLEST SMN-MCNC: 175 MG/DL (ref ?–200)
CO2 SERPL-SCNC: 29 MMOL/L (ref 21–32)
CREAT BLD-MCNC: 0.9 MG/DL (ref 0.55–1.02)
GLUCOSE BLD-MCNC: 94 MG/DL (ref 70–99)
HDLC SERPL-MCNC: 57 MG/DL (ref 40–59)
LDLC SERPL CALC-MCNC: 95 MG/DL (ref ?–100)
NONHDLC SERPL-MCNC: 118 MG/DL (ref ?–130)
OSMOLALITY SERPL CALC.SUM OF ELEC: 296 MOSM/KG (ref 275–295)
PATIENT FASTING Y/N/NP: NO
PATIENT FASTING Y/N/NP: NO
POTASSIUM SERPL-SCNC: 3.6 MMOL/L (ref 3.5–5.1)
SODIUM SERPL-SCNC: 142 MMOL/L (ref 136–145)
TRIGL SERPL-MCNC: 116 MG/DL (ref 30–149)
VLDLC SERPL CALC-MCNC: 23 MG/DL (ref 0–30)

## 2020-08-18 PROCEDURE — 36415 COLL VENOUS BLD VENIPUNCTURE: CPT

## 2020-08-18 PROCEDURE — 3078F DIAST BP <80 MM HG: CPT | Performed by: FAMILY MEDICINE

## 2020-08-18 PROCEDURE — 3008F BODY MASS INDEX DOCD: CPT | Performed by: FAMILY MEDICINE

## 2020-08-18 PROCEDURE — 80061 LIPID PANEL: CPT

## 2020-08-18 PROCEDURE — 90471 IMMUNIZATION ADMIN: CPT | Performed by: FAMILY MEDICINE

## 2020-08-18 PROCEDURE — 80048 BASIC METABOLIC PNL TOTAL CA: CPT

## 2020-08-18 PROCEDURE — 90750 HZV VACC RECOMBINANT IM: CPT | Performed by: FAMILY MEDICINE

## 2020-08-18 PROCEDURE — 3074F SYST BP LT 130 MM HG: CPT | Performed by: FAMILY MEDICINE

## 2020-08-18 PROCEDURE — 99396 PREV VISIT EST AGE 40-64: CPT | Performed by: FAMILY MEDICINE

## 2020-08-18 RX ORDER — LISINOPRIL AND HYDROCHLOROTHIAZIDE 12.5; 1 MG/1; MG/1
1 TABLET ORAL DAILY
Qty: 90 TABLET | Refills: 3 | Status: SHIPPED | OUTPATIENT
Start: 2020-08-18 | End: 2021-08-13

## 2020-08-18 NOTE — PROGRESS NOTES
HPI:    Patient ID: Yobany Manzo is a 60year old female. HPI    Review of Systems   Constitutional: Negative. Respiratory: Negative. Cardiovascular: Negative. Musculoskeletal: Positive for arthralgias. Skin: Negative.     Neurological: Neg Colonoscopy up-to-date. Nonfasting labs done today. Shingrix No. 1 given today. Mammogram up-to-date. Hypertension, benign–blood pressures have been elevated diastolic on HCT.   Plan to change to lisinopril/HCT 10/12.5 mg.  Discussed instructions and

## 2020-08-20 LAB
HSV1 DNA SPEC QL NAA+PROBE: NEGATIVE
HSV2 DNA SPEC QL NAA+PROBE: NEGATIVE

## 2020-09-27 RX ORDER — DIAZEPAM 5 MG/1
5 TABLET ORAL EVERY 6 HOURS PRN
Qty: 20 TABLET | Refills: 0 | Status: SHIPPED | OUTPATIENT
Start: 2020-09-27

## 2020-10-20 ENCOUNTER — OFFICE VISIT (OUTPATIENT)
Dept: FAMILY MEDICINE CLINIC | Facility: CLINIC | Age: 60
End: 2020-10-20
Payer: COMMERCIAL

## 2020-10-20 VITALS
BODY MASS INDEX: 23 KG/M2 | HEART RATE: 79 BPM | RESPIRATION RATE: 18 BRPM | WEIGHT: 141.81 LBS | SYSTOLIC BLOOD PRESSURE: 110 MMHG | DIASTOLIC BLOOD PRESSURE: 73 MMHG | TEMPERATURE: 96 F

## 2020-10-20 DIAGNOSIS — I10 ESSENTIAL HYPERTENSION: Primary | ICD-10-CM

## 2020-10-20 PROCEDURE — 90471 IMMUNIZATION ADMIN: CPT | Performed by: FAMILY MEDICINE

## 2020-10-20 PROCEDURE — 99213 OFFICE O/P EST LOW 20 MIN: CPT | Performed by: FAMILY MEDICINE

## 2020-10-20 PROCEDURE — 3078F DIAST BP <80 MM HG: CPT | Performed by: FAMILY MEDICINE

## 2020-10-20 PROCEDURE — 90750 HZV VACC RECOMBINANT IM: CPT | Performed by: FAMILY MEDICINE

## 2020-10-20 PROCEDURE — 3074F SYST BP LT 130 MM HG: CPT | Performed by: FAMILY MEDICINE

## 2020-10-20 NOTE — PROGRESS NOTES
HPI:    Patient ID: Khadijah Scott is a 60year old female. Hypertension  This is a chronic problem. The current episode started more than 1 year ago. The problem has been gradually improving since onset. The problem is controlled.  Pertinent negatives blood pressures and blood pressure today well controlled. Plan to continue lisinopril/HCT and follow-up in 6 months  Discussed challenges with making exercise part of routine and she works nights.   Recent increase in dyspnea with stairs, but correlates wi

## 2021-04-09 DIAGNOSIS — Z23 NEED FOR VACCINATION: ICD-10-CM

## 2021-05-05 ENCOUNTER — NURSE TRIAGE (OUTPATIENT)
Dept: FAMILY MEDICINE CLINIC | Facility: CLINIC | Age: 61
End: 2021-05-05

## 2021-05-05 RX ORDER — FLUCONAZOLE 150 MG/1
150 TABLET ORAL ONCE
Qty: 1 TABLET | Refills: 0 | Status: SHIPPED | OUTPATIENT
Start: 2021-05-05 | End: 2021-05-05

## 2021-05-05 NOTE — TELEPHONE ENCOUNTER
Action Requested: Summary for Provider     []  Critical Lab, Recommendations Needed  [x] Need Additional Advice  []   FYI    []   Need Orders  [] Need Medications Sent to Pharmacy  []  Other     SUMMARY: Pt states was on antibiotics for a gum infection a f

## 2021-06-10 ENCOUNTER — TELEPHONE (OUTPATIENT)
Dept: FAMILY MEDICINE CLINIC | Facility: CLINIC | Age: 61
End: 2021-06-10

## 2021-06-10 DIAGNOSIS — Z12.31 BREAST CANCER SCREENING BY MAMMOGRAM: Primary | ICD-10-CM

## 2021-06-10 NOTE — TELEPHONE ENCOUNTER
Dr. Prasanth Klein, patient is requesting a mammogram order. Last mammogram was completed 06/02/2020. Please advise on order.

## 2021-06-11 NOTE — TELEPHONE ENCOUNTER
It appears Dr. Blanka Sanchez has already signed the order for this mammogram.  Please call the patient and let her know.

## 2021-06-21 ENCOUNTER — OFFICE VISIT (OUTPATIENT)
Dept: FAMILY MEDICINE CLINIC | Facility: CLINIC | Age: 61
End: 2021-06-21
Payer: COMMERCIAL

## 2021-06-21 VITALS
TEMPERATURE: 97 F | RESPIRATION RATE: 16 BRPM | WEIGHT: 138 LBS | DIASTOLIC BLOOD PRESSURE: 80 MMHG | BODY MASS INDEX: 22 KG/M2 | SYSTOLIC BLOOD PRESSURE: 125 MMHG | HEART RATE: 59 BPM

## 2021-06-21 DIAGNOSIS — N94.9 VAGINAL DISCOMFORT: Primary | ICD-10-CM

## 2021-06-21 DIAGNOSIS — R21 RASH: ICD-10-CM

## 2021-06-21 PROCEDURE — 99214 OFFICE O/P EST MOD 30 MIN: CPT | Performed by: FAMILY MEDICINE

## 2021-06-21 PROCEDURE — 3079F DIAST BP 80-89 MM HG: CPT | Performed by: FAMILY MEDICINE

## 2021-06-21 PROCEDURE — 3074F SYST BP LT 130 MM HG: CPT | Performed by: FAMILY MEDICINE

## 2021-06-21 NOTE — PROGRESS NOTES
HPI: Richard Dozier is a 63year old female who presents for vaginal complaints. Had symptoms of a yeast infection about one month ago. Took Diflucan. Improved. She is now itchy. No discharge. No vaginal bleeding. No pain. Not currently sexually active.      Has

## 2021-06-23 RX ORDER — METRONIDAZOLE 7.5 MG/G
1 GEL VAGINAL NIGHTLY
Qty: 1 EACH | Refills: 0 | Status: SHIPPED | OUTPATIENT
Start: 2021-06-23 | End: 2021-06-28

## 2021-07-12 ENCOUNTER — HOSPITAL ENCOUNTER (OUTPATIENT)
Dept: MAMMOGRAPHY | Age: 61
Discharge: HOME | End: 2021-07-12
Attending: FAMILY MEDICINE
Payer: COMMERCIAL

## 2021-07-12 DIAGNOSIS — Z12.31 BREAST CANCER SCREENING BY MAMMOGRAM: ICD-10-CM

## 2021-07-12 PROCEDURE — 77067 SCR MAMMO BI INCL CAD: CPT | Performed by: FAMILY MEDICINE

## 2021-07-12 PROCEDURE — 77063 BREAST TOMOSYNTHESIS BI: CPT | Performed by: FAMILY MEDICINE

## 2021-08-26 ENCOUNTER — LAB ENCOUNTER (OUTPATIENT)
Dept: LAB | Age: 61
End: 2021-08-26
Attending: FAMILY MEDICINE
Payer: COMMERCIAL

## 2021-08-26 ENCOUNTER — OFFICE VISIT (OUTPATIENT)
Dept: FAMILY MEDICINE CLINIC | Facility: CLINIC | Age: 61
End: 2021-08-26
Payer: COMMERCIAL

## 2021-08-26 VITALS
HEIGHT: 65.75 IN | DIASTOLIC BLOOD PRESSURE: 67 MMHG | BODY MASS INDEX: 22.74 KG/M2 | WEIGHT: 139.81 LBS | SYSTOLIC BLOOD PRESSURE: 97 MMHG | RESPIRATION RATE: 18 BRPM | HEART RATE: 69 BPM | TEMPERATURE: 98 F

## 2021-08-26 DIAGNOSIS — H93.13 TINNITUS OF BOTH EARS: ICD-10-CM

## 2021-08-26 DIAGNOSIS — J45.20 MILD INTERMITTENT ASTHMA WITHOUT COMPLICATION: ICD-10-CM

## 2021-08-26 DIAGNOSIS — Z00.00 ROUTINE PHYSICAL EXAMINATION: ICD-10-CM

## 2021-08-26 DIAGNOSIS — Z00.00 ROUTINE PHYSICAL EXAMINATION: Primary | ICD-10-CM

## 2021-08-26 DIAGNOSIS — I10 HYPERTENSION, BENIGN: ICD-10-CM

## 2021-08-26 DIAGNOSIS — H91.93 BILATERAL HEARING LOSS, UNSPECIFIED HEARING LOSS TYPE: ICD-10-CM

## 2021-08-26 DIAGNOSIS — Z78.0 ASYMPTOMATIC MENOPAUSAL STATE: ICD-10-CM

## 2021-08-26 DIAGNOSIS — J30.89 ENVIRONMENTAL AND SEASONAL ALLERGIES: ICD-10-CM

## 2021-08-26 DIAGNOSIS — L82.1 SEBORRHEIC KERATOSIS: ICD-10-CM

## 2021-08-26 LAB
ANION GAP SERPL CALC-SCNC: 6 MMOL/L (ref 0–18)
BUN BLD-MCNC: 18 MG/DL (ref 7–18)
BUN/CREAT SERPL: 24.7 (ref 10–20)
CALCIUM BLD-MCNC: 9.2 MG/DL (ref 8.5–10.1)
CHLORIDE SERPL-SCNC: 102 MMOL/L (ref 98–112)
CHOLEST SMN-MCNC: 262 MG/DL (ref ?–200)
CO2 SERPL-SCNC: 28 MMOL/L (ref 21–32)
CREAT BLD-MCNC: 0.73 MG/DL
GLUCOSE BLD-MCNC: 91 MG/DL (ref 70–99)
HDLC SERPL-MCNC: 64 MG/DL (ref 40–59)
LDLC SERPL CALC-MCNC: 165 MG/DL (ref ?–100)
NONHDLC SERPL-MCNC: 198 MG/DL (ref ?–130)
OSMOLALITY SERPL CALC.SUM OF ELEC: 283 MOSM/KG (ref 275–295)
PATIENT FASTING Y/N/NP: YES
PATIENT FASTING Y/N/NP: YES
POTASSIUM SERPL-SCNC: 3.8 MMOL/L (ref 3.5–5.1)
SODIUM SERPL-SCNC: 136 MMOL/L (ref 136–145)
TRIGL SERPL-MCNC: 180 MG/DL (ref 30–149)
VLDLC SERPL CALC-MCNC: 36 MG/DL (ref 0–30)

## 2021-08-26 PROCEDURE — 80048 BASIC METABOLIC PNL TOTAL CA: CPT

## 2021-08-26 PROCEDURE — 3074F SYST BP LT 130 MM HG: CPT | Performed by: FAMILY MEDICINE

## 2021-08-26 PROCEDURE — 80061 LIPID PANEL: CPT

## 2021-08-26 PROCEDURE — 3078F DIAST BP <80 MM HG: CPT | Performed by: FAMILY MEDICINE

## 2021-08-26 PROCEDURE — 36415 COLL VENOUS BLD VENIPUNCTURE: CPT

## 2021-08-26 PROCEDURE — 99396 PREV VISIT EST AGE 40-64: CPT | Performed by: FAMILY MEDICINE

## 2021-08-26 PROCEDURE — 17110 DESTRUCTION B9 LES UP TO 14: CPT | Performed by: FAMILY MEDICINE

## 2021-08-26 PROCEDURE — 3008F BODY MASS INDEX DOCD: CPT | Performed by: FAMILY MEDICINE

## 2021-08-26 RX ORDER — LORATADINE 10 MG/1
10 TABLET ORAL DAILY
Qty: 30 TABLET | Refills: 5 | Status: SHIPPED | OUTPATIENT
Start: 2021-08-26

## 2021-08-26 RX ORDER — LISINOPRIL AND HYDROCHLOROTHIAZIDE 12.5; 1 MG/1; MG/1
TABLET ORAL
COMMUNITY
Start: 2021-08-13 | End: 2022-01-03

## 2021-08-26 RX ORDER — MONTELUKAST SODIUM 10 MG/1
10 TABLET ORAL DAILY
Qty: 90 TABLET | Refills: 3 | Status: SHIPPED | OUTPATIENT
Start: 2021-08-26 | End: 2022-08-21

## 2021-08-26 NOTE — PROGRESS NOTES
HPI/Subjective:   Patient ID: Harshal Franks is a 63year old female. Patient presents for routine physical.      History/Other:   Review of Systems   Constitutional: Negative. HENT: Positive for hearing loss and tinnitus.     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 Exercising regularly.   Pap smear up-to-date  Labs done today  Defers

## 2022-01-04 RX ORDER — LISINOPRIL AND HYDROCHLOROTHIAZIDE 12.5; 1 MG/1; MG/1
1 TABLET ORAL DAILY
Qty: 90 TABLET | Refills: 3 | Status: SHIPPED | OUTPATIENT
Start: 2022-01-04 | End: 2022-11-16

## 2022-02-26 ENCOUNTER — LAB REQUISITION (OUTPATIENT)
Dept: SURGERY | Age: 62
End: 2022-02-26
Payer: COMMERCIAL

## 2022-02-27 LAB — SARS-COV-2 RNA RESP QL NAA+PROBE: NOT DETECTED

## 2022-03-01 ENCOUNTER — LAB REQUISITION (OUTPATIENT)
Dept: SURGERY | Age: 62
End: 2022-03-01
Payer: COMMERCIAL

## 2022-03-01 PROCEDURE — 88304 TISSUE EXAM BY PATHOLOGIST: CPT | Performed by: PODIATRIST

## 2022-03-08 ENCOUNTER — OFFICE VISIT (OUTPATIENT)
Dept: AUDIOLOGY | Facility: CLINIC | Age: 62
End: 2022-03-08
Payer: COMMERCIAL

## 2022-03-08 DIAGNOSIS — H93.13 TINNITUS, BILATERAL: Primary | ICD-10-CM

## 2022-03-08 DIAGNOSIS — H90.3 ASYMMETRICAL SENSORINEURAL HEARING LOSS: ICD-10-CM

## 2022-03-08 PROCEDURE — 92567 TYMPANOMETRY: CPT | Performed by: AUDIOLOGIST

## 2022-03-08 PROCEDURE — 92557 COMPREHENSIVE HEARING TEST: CPT | Performed by: AUDIOLOGIST

## 2022-03-12 ENCOUNTER — NURSE TRIAGE (OUTPATIENT)
Dept: FAMILY MEDICINE CLINIC | Facility: CLINIC | Age: 62
End: 2022-03-12

## 2022-04-07 ENCOUNTER — NURSE TRIAGE (OUTPATIENT)
Dept: FAMILY MEDICINE CLINIC | Facility: CLINIC | Age: 62
End: 2022-04-07

## 2022-04-08 ENCOUNTER — OFFICE VISIT (OUTPATIENT)
Dept: FAMILY MEDICINE CLINIC | Facility: CLINIC | Age: 62
End: 2022-04-08
Payer: COMMERCIAL

## 2022-04-08 VITALS
HEIGHT: 66 IN | BODY MASS INDEX: 22.66 KG/M2 | DIASTOLIC BLOOD PRESSURE: 82 MMHG | SYSTOLIC BLOOD PRESSURE: 137 MMHG | WEIGHT: 141 LBS | HEART RATE: 74 BPM

## 2022-04-08 DIAGNOSIS — L98.8 ALLERGIC DERMATOSIS: Primary | ICD-10-CM

## 2022-04-08 PROCEDURE — 3079F DIAST BP 80-89 MM HG: CPT | Performed by: FAMILY MEDICINE

## 2022-04-08 PROCEDURE — 3075F SYST BP GE 130 - 139MM HG: CPT | Performed by: FAMILY MEDICINE

## 2022-04-08 PROCEDURE — 3008F BODY MASS INDEX DOCD: CPT | Performed by: FAMILY MEDICINE

## 2022-04-08 PROCEDURE — 99213 OFFICE O/P EST LOW 20 MIN: CPT | Performed by: FAMILY MEDICINE

## 2022-04-08 RX ORDER — HYDRALAZINE HYDROCHLORIDE 25 MG/1
25 TABLET, FILM COATED ORAL 3 TIMES DAILY
Qty: 60 TABLET | Refills: 1 | Status: SHIPPED | OUTPATIENT
Start: 2022-04-08

## 2022-04-08 RX ORDER — FEXOFENADINE HCL 180 MG/1
180 TABLET ORAL DAILY
Qty: 90 TABLET | Refills: 3 | COMMUNITY
Start: 2022-04-08

## 2022-04-24 LAB — AMB EXT COVID-19 RESULT: DETECTED

## 2022-04-25 ENCOUNTER — NURSE TRIAGE (OUTPATIENT)
Dept: FAMILY MEDICINE CLINIC | Facility: CLINIC | Age: 62
End: 2022-04-25

## 2022-04-25 RX ORDER — BUDESONIDE 180 UG/1
1 AEROSOL, POWDER RESPIRATORY (INHALATION) 2 TIMES DAILY
Qty: 3 EACH | Refills: 1 | Status: SHIPPED | OUTPATIENT
Start: 2022-04-25 | End: 2022-05-23 | Stop reason: CLARIF

## 2022-04-25 RX ORDER — BUDESONIDE 180 UG/1
1 AEROSOL, POWDER RESPIRATORY (INHALATION) 2 TIMES DAILY
Qty: 1 EACH | Refills: 0 | Status: SHIPPED | OUTPATIENT
Start: 2022-04-25 | End: 2022-04-25

## 2022-04-25 RX ORDER — DIAZEPAM 5 MG/1
5 TABLET ORAL EVERY 6 HOURS PRN
Qty: 20 TABLET | Refills: 0 | Status: SHIPPED | OUTPATIENT
Start: 2022-04-25

## 2022-04-25 NOTE — TELEPHONE ENCOUNTER
[facility]. Requested Prescriptions   Pending Prescriptions Disp Refills    Budesonide (PULMICORT FLEXHALER) 180 MCG/ACT Inhalation Aerosol Powder, Breath Activated 1 each 0     Sig: Inhale 1 puff into the lungs 2 (two) times daily.  Can be increased to 2 puffs twice a day adequately controlled in 1 week        Asthma & COPD Medication Protocol Passed - 4/25/2022 11:15 AM        Passed - Appointment in past 6 or next 3 months              Recent Outpatient Visits              2 weeks ago Allergic dermatosis    [address] Jodine Parkin, MD    Office Visit    1 month ago Tinnitus, bilateral    [facility] Tressa Bragg    Office Visit    8 months ago Routine physical examination    [facility], [address], Haily Paris MD    Office Visit    10 months ago Vaginal discomfort    [facility], [address], Laura Garcia DO    Office Visit    1 year ago Essential hypertension    [facility], [address], Aroldo Harley MD    Office Visit            Future Appointments         Provider Department Appt Notes    In 1 week MD [facility] [address] [facility] NEW PT---CONSULT

## 2022-04-25 NOTE — TELEPHONE ENCOUNTER
Please review; protocol failed/no protocol. Requested Prescriptions   Pending Prescriptions Disp Refills    diazePAM 5 MG Oral Tab 20 tablet 0     Sig: Take 1 tablet (5 mg total) by mouth every 6 (six) hours as needed for Anxiety.         There is no refill protocol information for this order         Recent Outpatient Visits              2 weeks ago Allergic dermatosis    [address] Ranelle Lucy, MD    Office Visit    1 month ago Tinnitus, bilateral    [facility] Tressa Gallardo    Office Visit    8 months ago Routine physical examination    [address], Haily Coy MD    Office Visit    10 months ago Vaginal discomfort    [address] [place], [address], Laura Garcia DO    Office Visit    1 year ago Essential hypertension    [address], [address], April Harley MD    Office Visit          Future Appointments         Provider Department Appt Notes    In 1 week Odalys Sanchez MD [facility] NEW PT---CONSULT

## 2022-04-25 NOTE — TELEPHONE ENCOUNTER
Patient states she is out of Pulmicort inhaler. She is requesting 1 inhaler to be sent to [facility] in [place] and additional inhalers to [facility] order pharmacy.

## 2022-05-02 ENCOUNTER — OFFICE VISIT (OUTPATIENT)
Dept: OTOLARYNGOLOGY | Facility: CLINIC | Age: 62
End: 2022-05-02
Payer: COMMERCIAL

## 2022-05-02 VITALS — WEIGHT: 141 LBS | BODY MASS INDEX: 22.66 KG/M2 | HEIGHT: 66 IN

## 2022-05-02 DIAGNOSIS — H93.13 TINNITUS OF BOTH EARS: Primary | ICD-10-CM

## 2022-05-02 PROCEDURE — 99243 OFF/OP CNSLTJ NEW/EST LOW 30: CPT | Performed by: OTOLARYNGOLOGY

## 2022-05-02 PROCEDURE — 3008F BODY MASS INDEX DOCD: CPT | Performed by: OTOLARYNGOLOGY

## 2022-05-05 RX ORDER — HYDRALAZINE HYDROCHLORIDE 25 MG/1
25 TABLET, FILM COATED ORAL 3 TIMES DAILY
Qty: 60 TABLET | Refills: 1 | Status: SHIPPED | OUTPATIENT
Start: 2022-05-05 | End: 2022-07-27

## 2022-05-05 NOTE — TELEPHONE ENCOUNTER
[facility]. Requested Prescriptions   Pending Prescriptions Disp Refills    hydrALAZINE 25 MG Oral Tab 60 tablet 1     Sig: Take 1 tablet (25 mg total) by mouth 3 (three) times daily. As needed for itching.         Hypertensive Medications Protocol Passed - 5/5/2022  3:59 AM        Passed - CMP or BMP in past 12 months        Passed - Appointment in past 6 or next 3 months        Passed - GFR Non- > 50     Lab Results   Component Value Date    GFRNAA 89 08/26/2021                     Recent Outpatient Visits              3 days ago Tinnitus of both ears    [address] [facility] Wilber Cunningham MD    Office Visit    3 weeks ago Allergic dermatosis    [address] Jonathan Catalan MD    Office Visit    1 month ago Tinnitus, bilateral    [facility] Tressa Tracy    Office Visit    8 months ago Routine physical examination    [facility], [address] Jonathan Catalan MD    Office Visit    10 months ago Vaginal discomfort    [facility], [address], Laura Garcia, [place]    Office Visit

## 2022-05-10 ENCOUNTER — PATIENT MESSAGE (OUTPATIENT)
Dept: FAMILY MEDICINE CLINIC | Facility: CLINIC | Age: 62
End: 2022-05-10

## 2022-05-10 RX ORDER — FEXOFENADINE HCL 180 MG/1
180 TABLET ORAL DAILY
Qty: 90 TABLET | Refills: 1 | Status: SHIPPED | OUTPATIENT
Start: 2022-05-10 | End: 2023-01-03

## 2022-05-10 NOTE — TELEPHONE ENCOUNTER
From: Ancelmo Carpenter  To: Lucy Jules MD  Sent: 5/10/2022 12:30 AM CDT  Subject: medication request    I would like a renewal of the fungal prescription  clotrimazole-betamethasone sent to [facility], as I have a new spot on my upper abdomen /below the right breast area that needs attention.     Thanks,  Arthur James

## 2022-05-10 NOTE — TELEPHONE ENCOUNTER
Refill passed per [address] [place] protocol. Requested Prescriptions   Pending Prescriptions Disp Refills    fexofenadine 180 MG Oral Tab 90 tablet 3     Sig: Take 1 tablet (180 mg total) by mouth daily.         Allergy Medication Protocol Passed - 5/10/2022  9:16 AM        Passed - Appointment in past 12 or next 3 months              Recent Outpatient Visits              1 week ago Tinnitus of both [address]  ENT Jake Mcdowell MD    Office Visit    1 month ago [facility] dermatosis    [facility] Dam[facility]o [facility]    Office Visit    2 months ago Tinnitus, bilateral    [facility] Tressa Berry    Office Visit    8 months ago Routine physical examination    [address], [facility] Damaso Perez MD    Office Visit    10 months ago Vaginal discomfort    [address] [place], [place]    Office Visit

## 2022-05-14 ENCOUNTER — PATIENT MESSAGE (OUTPATIENT)
Dept: FAMILY MEDICINE CLINIC | Facility: CLINIC | Age: 62
End: 2022-05-14

## 2022-05-15 RX ORDER — CLOTRIMAZOLE AND BETAMETHASONE DIPROPIONATE 10; .64 MG/G; MG/G
1 CREAM TOPICAL 2 TIMES DAILY PRN
Qty: 60 G | Refills: 1 | Status: SHIPPED | OUTPATIENT
Start: 2022-05-15

## 2022-05-15 NOTE — TELEPHONE ENCOUNTER
From: Viktor Aguila  To: Lucy Blanton MD  Sent: 5/10/2022 12:30 AM CDT  Subject: medication request    I would like a renewal of the fungal prescription  clotrimazole-betamethasone sent to [facility], as I have a new spot on my upper abdomen /below the right breast area that needs attention.     Thanks,  Elysia Sood

## 2022-05-15 NOTE — TELEPHONE ENCOUNTER
Please review refill protocol failed/ no protocol  Requested Prescriptions   Pending Prescriptions Disp Refills    clotrimazole-betamethasone 1-0.05 % External Cream 60 g 0     Sig: Apply 1 Application topically 2 (two) times daily as needed.         There is no refill protocol information for this order

## 2022-05-23 ENCOUNTER — TELEPHONE (OUTPATIENT)
Dept: FAMILY MEDICINE CLINIC | Facility: CLINIC | Age: 62
End: 2022-05-23

## 2022-05-23 RX ORDER — BUDESONIDE 180 UG/1
1 AEROSOL, POWDER RESPIRATORY (INHALATION) 2 TIMES DAILY
Qty: 3 EACH | Refills: 3 | Status: SHIPPED | OUTPATIENT
Start: 2022-05-23

## 2022-06-08 ENCOUNTER — NURSE TRIAGE (OUTPATIENT)
Dept: FAMILY MEDICINE CLINIC | Facility: CLINIC | Age: 62
End: 2022-06-08

## 2022-06-16 ENCOUNTER — OFFICE VISIT (OUTPATIENT)
Dept: FAMILY MEDICINE CLINIC | Facility: CLINIC | Age: 62
End: 2022-06-16
Payer: COMMERCIAL

## 2022-06-16 VITALS
SYSTOLIC BLOOD PRESSURE: 113 MMHG | HEIGHT: 66 IN | HEART RATE: 80 BPM | RESPIRATION RATE: 18 BRPM | BODY MASS INDEX: 22.4 KG/M2 | DIASTOLIC BLOOD PRESSURE: 72 MMHG | TEMPERATURE: 98 F | WEIGHT: 139.38 LBS

## 2022-06-16 DIAGNOSIS — Z48.02 VISIT FOR SUTURE REMOVAL: Primary | ICD-10-CM

## 2022-06-16 DIAGNOSIS — Z12.31 ENCOUNTER FOR SCREENING MAMMOGRAM FOR MALIGNANT NEOPLASM OF BREAST: ICD-10-CM

## 2022-06-16 PROCEDURE — 99213 OFFICE O/P EST LOW 20 MIN: CPT | Performed by: FAMILY MEDICINE

## 2022-06-16 PROCEDURE — 3008F BODY MASS INDEX DOCD: CPT | Performed by: FAMILY MEDICINE

## 2022-06-16 PROCEDURE — 3078F DIAST BP <80 MM HG: CPT | Performed by: FAMILY MEDICINE

## 2022-06-16 PROCEDURE — 3074F SYST BP LT 130 MM HG: CPT | Performed by: FAMILY MEDICINE

## 2022-07-27 ENCOUNTER — PATIENT MESSAGE (OUTPATIENT)
Dept: FAMILY MEDICINE CLINIC | Facility: CLINIC | Age: 62
End: 2022-07-27

## 2022-07-27 ENCOUNTER — HOSPITAL ENCOUNTER (OUTPATIENT)
Dept: MAMMOGRAPHY | Age: 62
Discharge: HOME | End: 2022-07-27
Attending: FAMILY MEDICINE
Payer: COMMERCIAL

## 2022-07-27 DIAGNOSIS — Z12.31 ENCOUNTER FOR SCREENING MAMMOGRAM FOR MALIGNANT NEOPLASM OF BREAST: ICD-10-CM

## 2022-07-27 PROCEDURE — 77067 SCR MAMMO BI INCL CAD: CPT | Performed by: FAMILY MEDICINE

## 2022-07-27 PROCEDURE — 77063 BREAST TOMOSYNTHESIS BI: CPT | Performed by: FAMILY MEDICINE

## 2022-07-27 RX ORDER — HYDROXYZINE HYDROCHLORIDE 25 MG/1
25 TABLET, FILM COATED ORAL 3 TIMES DAILY PRN
Qty: 30 TABLET | Refills: 0 | Status: SHIPPED | OUTPATIENT
Start: 2022-07-27 | End: 2022-07-27

## 2022-07-27 RX ORDER — HYDROXYZINE HYDROCHLORIDE 25 MG/1
25 TABLET, FILM COATED ORAL 3 TIMES DAILY PRN
Qty: 180 TABLET | Refills: 1 | Status: SHIPPED | OUTPATIENT
Start: 2022-07-27

## 2022-07-27 NOTE — TELEPHONE ENCOUNTER
Patient contacted. Discussed testing with dermatology for contact dermatitis ingredients. Yes, hydralazine was prescribed in error. She is not aware of any lightheadedness or other side effects. But she has continued to have itching. Discontinue hydralazine. Start hydroxyzine as directed. Requested Rx to local pharmacy and mail in pharmacy.

## 2022-08-26 ENCOUNTER — HOSPITAL ENCOUNTER (OUTPATIENT)
Dept: BONE DENSITY | Facility: HOSPITAL | Age: 62
Discharge: HOME | End: 2022-08-26
Attending: FAMILY MEDICINE
Payer: COMMERCIAL

## 2022-08-26 DIAGNOSIS — Z78.0 ASYMPTOMATIC MENOPAUSAL STATE: ICD-10-CM

## 2022-08-26 PROCEDURE — 77080 DXA BONE DENSITY AXIAL: CPT | Performed by: FAMILY MEDICINE

## 2022-09-30 ENCOUNTER — OFFICE VISIT (OUTPATIENT)
Dept: FAMILY MEDICINE CLINIC | Facility: CLINIC | Age: 62
End: 2022-09-30

## 2022-09-30 ENCOUNTER — LAB ENCOUNTER (OUTPATIENT)
Dept: LAB | Age: 62
End: 2022-09-30
Attending: FAMILY MEDICINE
Payer: COMMERCIAL

## 2022-09-30 VITALS
SYSTOLIC BLOOD PRESSURE: 100 MMHG | HEIGHT: 65.05 IN | OXYGEN SATURATION: 99 % | WEIGHT: 140 LBS | BODY MASS INDEX: 23.32 KG/M2 | DIASTOLIC BLOOD PRESSURE: 60 MMHG | TEMPERATURE: 97 F | HEART RATE: 79 BPM

## 2022-09-30 DIAGNOSIS — Z00.00 ROUTINE PHYSICAL EXAMINATION: Primary | ICD-10-CM

## 2022-09-30 DIAGNOSIS — L98.8 ALLERGIC DERMATOSIS: ICD-10-CM

## 2022-09-30 DIAGNOSIS — N89.8 VAGINAL IRRITATION: ICD-10-CM

## 2022-09-30 DIAGNOSIS — I10 HYPERTENSION, BENIGN: ICD-10-CM

## 2022-09-30 DIAGNOSIS — Z00.00 ROUTINE PHYSICAL EXAMINATION: ICD-10-CM

## 2022-09-30 DIAGNOSIS — J45.20 MILD INTERMITTENT ASTHMA WITHOUT COMPLICATION: ICD-10-CM

## 2022-09-30 DIAGNOSIS — R20.0 HAND NUMBNESS: ICD-10-CM

## 2022-09-30 LAB
ANION GAP SERPL CALC-SCNC: 7 MMOL/L (ref 0–18)
BUN BLD-MCNC: 14 MG/DL (ref 7–18)
BUN/CREAT SERPL: 17.5 (ref 10–20)
CALCIUM BLD-MCNC: 9.4 MG/DL (ref 8.5–10.1)
CHLORIDE SERPL-SCNC: 101 MMOL/L (ref 98–112)
CHOLEST SERPL-MCNC: 221 MG/DL (ref ?–200)
CO2 SERPL-SCNC: 28 MMOL/L (ref 21–32)
CREAT BLD-MCNC: 0.8 MG/DL
DEPRECATED RDW RBC AUTO: 39.6 FL (ref 35.1–46.3)
ERYTHROCYTE [DISTWIDTH] IN BLOOD BY AUTOMATED COUNT: 11.8 % (ref 11–15)
FASTING PATIENT LIPID ANSWER: YES
FASTING STATUS PATIENT QL REPORTED: YES
GFR SERPLBLD BASED ON 1.73 SQ M-ARVRAT: 83 ML/MIN/1.73M2 (ref 60–?)
GLUCOSE BLD-MCNC: 104 MG/DL (ref 70–99)
HCT VFR BLD AUTO: 40.8 %
HDLC SERPL-MCNC: 57 MG/DL (ref 40–59)
HGB BLD-MCNC: 13.4 G/DL
LDLC SERPL CALC-MCNC: 144 MG/DL (ref ?–100)
MCH RBC QN AUTO: 29.8 PG (ref 26–34)
MCHC RBC AUTO-ENTMCNC: 32.8 G/DL (ref 31–37)
MCV RBC AUTO: 90.7 FL
NONHDLC SERPL-MCNC: 164 MG/DL (ref ?–130)
OSMOLALITY SERPL CALC.SUM OF ELEC: 283 MOSM/KG (ref 275–295)
PLATELET # BLD AUTO: 249 10(3)UL (ref 150–450)
POTASSIUM SERPL-SCNC: 3.8 MMOL/L (ref 3.5–5.1)
RBC # BLD AUTO: 4.5 X10(6)UL
SODIUM SERPL-SCNC: 136 MMOL/L (ref 136–145)
TRIGL SERPL-MCNC: 114 MG/DL (ref 30–149)
VLDLC SERPL CALC-MCNC: 21 MG/DL (ref 0–30)
WBC # BLD AUTO: 4.2 X10(3) UL (ref 4–11)

## 2022-09-30 PROCEDURE — 90686 IIV4 VACC NO PRSV 0.5 ML IM: CPT | Performed by: FAMILY MEDICINE

## 2022-09-30 PROCEDURE — 99396 PREV VISIT EST AGE 40-64: CPT | Performed by: FAMILY MEDICINE

## 2022-09-30 PROCEDURE — 80061 LIPID PANEL: CPT

## 2022-09-30 PROCEDURE — 3078F DIAST BP <80 MM HG: CPT | Performed by: FAMILY MEDICINE

## 2022-09-30 PROCEDURE — 85027 COMPLETE CBC AUTOMATED: CPT

## 2022-09-30 PROCEDURE — 90472 IMMUNIZATION ADMIN EACH ADD: CPT | Performed by: FAMILY MEDICINE

## 2022-09-30 PROCEDURE — 90677 PCV20 VACCINE IM: CPT | Performed by: FAMILY MEDICINE

## 2022-09-30 PROCEDURE — 90471 IMMUNIZATION ADMIN: CPT | Performed by: FAMILY MEDICINE

## 2022-09-30 PROCEDURE — 80048 BASIC METABOLIC PNL TOTAL CA: CPT

## 2022-09-30 PROCEDURE — 3074F SYST BP LT 130 MM HG: CPT | Performed by: FAMILY MEDICINE

## 2022-09-30 PROCEDURE — 36415 COLL VENOUS BLD VENIPUNCTURE: CPT

## 2022-09-30 PROCEDURE — 3008F BODY MASS INDEX DOCD: CPT | Performed by: FAMILY MEDICINE

## 2022-09-30 RX ORDER — FLUCONAZOLE 150 MG/1
150 TABLET ORAL
Qty: 2 TABLET | Refills: 0 | Status: SHIPPED | OUTPATIENT
Start: 2022-09-30

## 2022-11-08 ENCOUNTER — TELEPHONE (OUTPATIENT)
Dept: FAMILY MEDICINE CLINIC | Facility: CLINIC | Age: 62
End: 2022-11-08

## 2022-11-08 NOTE — TELEPHONE ENCOUNTER
Patient requesting to speak with Dr. Jose Gomez RN regarding a follow up question and a new development. Patient did not want to elaborate on reasons and was advised a message will be sent. Please advise.

## 2022-11-16 ENCOUNTER — PATIENT MESSAGE (OUTPATIENT)
Dept: FAMILY MEDICINE CLINIC | Facility: CLINIC | Age: 62
End: 2022-11-16

## 2022-11-16 PROBLEM — C44.320 SCC (SQUAMOUS CELL CARCINOMA), FACE: Status: ACTIVE | Noted: 2022-11-16

## 2022-11-16 RX ORDER — LISINOPRIL 10 MG/1
10 TABLET ORAL DAILY
Qty: 90 TABLET | Refills: 3 | Status: SHIPPED | OUTPATIENT
Start: 2022-11-16 | End: 2023-11-11

## 2022-11-16 NOTE — TELEPHONE ENCOUNTER
Patient contacted. Plan to discontinue HCT but continue lisinopril 10 mg daily. Patient will send blood pressure readings.   Discussed recent diagnosis SCC, planning Mohs surgery with Ayala Ha 1/2023, but may do this sooner with another dermatologist.

## 2022-12-29 RX ORDER — HYDROXYZINE HYDROCHLORIDE 25 MG/1
25 TABLET, FILM COATED ORAL 3 TIMES DAILY PRN
Qty: 180 TABLET | Refills: 1 | Status: SHIPPED | OUTPATIENT
Start: 2022-12-29

## 2023-01-04 RX ORDER — FEXOFENADINE HCL 180 MG/1
180 TABLET ORAL DAILY
Qty: 90 TABLET | Refills: 1 | Status: SHIPPED | OUTPATIENT
Start: 2023-01-04

## 2023-01-04 NOTE — TELEPHONE ENCOUNTER
Refill passed per [facility] protocol. .  Requested Prescriptions   Pending Prescriptions Disp Refills    fexofenadine 180 MG Oral Tab 90 tablet 1     Sig: Take 1 tablet (180 mg total) by mouth daily.        Allergy Medication Protocol Passed - 1/3/2023 10:09 AM        Passed - In person appointment or virtual visit in the past 12 mos or appointment in next 3 mos     Recent Outpatient Visits              3 months ago Routine physical examination    [facility], [address], [facility] Efrain Metcalf MD    Office Visit    6 months ago Visit for suture removal    [facility], [address], [facility] Efrain Metcalf MD    Office Visit    8 months ago Tinnitus of both [address]  ENT Caty Doll MD    Office Visit    9 months ago Allergic dermatosis    [facility], [facility] Efrain Metcalf MD    Office Visit    10 months ago Tinnitus, bilateral    [facility] Tressa Cross    Office Visit                           Recent Outpatient Visits              3 months ago Routine physical examination    [facility], [address], [facility] Efrain Metcalf MD    Office Visit    6 months ago Visit for suture removal    [facility], [address], [facility] Efrain Metcalf MD    Office Visit    8 months ago Tinnitus of both [address] ENT Caty Doll MD    Office Visit    9 months ago Allergic dermatosis    [facility], [address], [facility] Efrain Metcalf MD    Office Visit    10 months ago Tinnitus, bilateral    [facility] Tressa Cross    Office Visit

## 2023-01-25 ENCOUNTER — PATIENT MESSAGE (OUTPATIENT)
Dept: FAMILY MEDICINE CLINIC | Facility: CLINIC | Age: 63
End: 2023-01-25

## 2023-01-28 RX ORDER — FEXOFENADINE HCL 180 MG/1
180 TABLET ORAL DAILY
Qty: 90 TABLET | Refills: 1 | Status: SHIPPED | OUTPATIENT
Start: 2023-01-28

## 2023-01-28 RX ORDER — TRIAMCINOLONE ACETONIDE 1 MG/G
CREAM TOPICAL 2 TIMES DAILY PRN
Qty: 80 G | Refills: 1 | Status: SHIPPED | OUTPATIENT
Start: 2023-01-28 | End: 2024-01-28

## 2023-01-28 NOTE — TELEPHONE ENCOUNTER
Allergy med refilled per protocol. No protocol for topicals please advise. Refill passed per [place] [facility], [facility] protocol. Requested Prescriptions   Pending Prescriptions Disp Refills    fexofenadine 180 MG Oral Tab 90 tablet 1     Sig: Take 1 tablet (180 mg total) by mouth daily. Allergy Medication Protocol Passed - 1/27/2023  2:52 PM        Passed - In person appointment or virtual visit in the past 12 mos or appointment in next 3 mos     Recent Outpatient Visits              4 months ago Routine physical examination    [address] [facility], [facility] Jennifer Gonzalez MD    Office Visit    7 months ago Visit for suture removal    [address] [facility], [facility] Jennifer Gonzalez MD    Office Visit    9 months ago Tinnitus of both ears    [address], [facility], Jarrett Cardona MD    Office Visit    9 months ago Allergic dermatosis    [address], [facility], [facility] Jennifer Gonzalez MD    Office Visit    10 months ago Tinnitus, bilateral    [address] [facility], [facility], Au.D    Office Visit                        triamcinolone 0.1 % External Cream 80 g 0     Sig: Apply topically 2 (two) times daily as needed.        There is no refill protocol information for this order         Recent Outpatient Visits              4 months ago Routine physical examination    Jami Lopez MD    Office Visit    7 months ago Visit for suture removal    Jami Lopez MD    Office Visit    9 months ago Tinnitus of both [address], Jarrett Cardona MD    Office Visit    9 months ago Allergic dermatosis    [address], [facility] , [facility] Jennifer Gonzalez MD    Office Visit    10 months ago Tinnitus, bilateral    [facility], [address] [facility],[address] [facility], [facility] Tressa Walters    Office Visit

## 2023-05-08 ENCOUNTER — TELEPHONE (OUTPATIENT)
Facility: CLINIC | Age: 63
End: 2023-05-08

## 2023-05-08 NOTE — TELEPHONE ENCOUNTER
----- Message from Janeen Strange, [address] [place] [facility] sent at 7/2/2018  2:31 PM CDT -----  Regarding: Recall colon   Recall colon in 5 years per SDK.  Colon done 6-27-18

## 2023-06-30 NOTE — TELEPHONE ENCOUNTER
Patient called in stating she has had a head cold and sore throat for 3 days. She states she has been using OTC [employer] brand Cold and flu along with herbal tea. She is requesting for a refill of her Asmanex due to her symptoms.     Medication pended for DENNIS Salena Lozada RDN, CDN #[id number]  Also available on [employer]

## 2023-09-05 RX ORDER — HYDROXYZINE HYDROCHLORIDE 25 MG/1
25 TABLET, FILM COATED ORAL 3 TIMES DAILY PRN
Qty: 180 TABLET | Refills: 0 | Status: SHIPPED | OUTPATIENT
Start: 2023-09-05

## 2023-09-05 NOTE — TELEPHONE ENCOUNTER
Please review. Protocol failed / No protocol. Requested Prescriptions   Pending Prescriptions Disp Refills    HYDROXYZINE 25 MG Oral Tab [Pharmacy Med Name: HYDROXYZ HCL TAB 25MG] 180 tablet 0     Sig: TAKE 1 TABLET 3 TIMES A DAYAS NEEDED FOR ITCHING.        There is no refill protocol information for this order          Recent Outpatient Visits              11 months ago Routine physical examination    [address] [place] [address], Haily Metcalf MD    Office Visit    1 year ago Visit for suture removal    [address] [place] [address], Haily Metcalf MD    Office Visit    1 year ago Tinnitus of both [place], [facility], Haily Doll MD    Office Visit    1 year ago Allergic dermatosis    [facility], [facility], Haily Metcalf MD    Office Visit    1 year ago Tinnitus, bilateral    [address] [place] [address], [place] Tressa Cross    Office Visit

## 2023-10-30 RX ORDER — LISINOPRIL 10 MG/1
10 TABLET ORAL DAILY
Qty: 90 TABLET | Refills: 3 | Status: SHIPPED | OUTPATIENT
Start: 2023-10-30

## 2023-10-30 RX ORDER — HYDROXYZINE HYDROCHLORIDE 25 MG/1
25 TABLET, FILM COATED ORAL EVERY 8 HOURS PRN
Qty: 180 TABLET | Refills: 0 | Status: SHIPPED | OUTPATIENT
Start: 2023-10-30

## 2023-10-30 NOTE — TELEPHONE ENCOUNTER
Please review; protocol failed. No active /future labs noted   Please see message below for upcoming appointment. Future Appointments   Date Time Provider Kristine Jocelyn   1/9/2024 11:15 AM Damaso Perez MD [facility]       Requested Prescriptions   Pending Prescriptions Disp Refills    LISINOPRIL 10 MG Oral Tab [Pharmacy Med Name: LISINOPRIL TAB 10MG] 90 tablet 3     Sig: TAKE 1 TABLET DAILY       Hypertensive Medications Protocol Failed - 10/28/2023  7:16 AM        Failed - CMP or BMP in past 6 months     No results found for this or any previous visit (from the past 4392 hour(s)).             Failed - In person appointment or virtual visit in the past 6 months     Recent Outpatient Visits              1 year ago Routine physical examination    [address] [place] [address] Damaso Perez MD    Office Visit    1 year ago Visit for suture removal    [address] [place] [address] tamar Perez MD    Office Visit    1 year ago Tinnitus of both ears    [address], [address], [place]    Office Visit    1 year ago Allergic dermatosis    [address] [place] [address] tamar Perez MD    Office Visit    1 year ago Tinnitus, bilateral    [address], [address], [place] Tressa Berry    Office Visit          Future Appointments         Provider Department Appt Notes    In 2 months Damaso Perez MD [address] [place] [address], [address] or GFRNAA > 50     GFR Evaluation            Passed - In person appointment in the past 12 or next 3 months     Recent Outpatient Visits              1 year ago Routine physical examination    Jw Aguiar MD    Office Visit    1 year ago Visit for suture removal    [address] [place] [address] Apolinar Allred MD    Office Visit    1 year ago Tinnitus of both ears    [address] [facility] Ilan Yang MD    Office Visit    1 year ago Allergic dermatosis    [facility], [facility] [address], [facility] Sharda Boothe MD    Office Visit    1 year ago Tinnitus, bilateral    [facility], [address], [place], [place]    Office Visit          Future Appointments         Provider Department Appt Notes    In 2 [place] [address] [facility], [address] - Last BP reading less than 140/90     BP Readings from Last 1 Encounters:  09/30/22 : 100/60                HYDROXYZINE 25 MG Oral Tab [Pharmacy Med Name: HYDROXYZ HCL TAB 25MG] 180 tablet 0     Sig: TAKE 1 TABLET 3 TIMES A DAYAS NEEDED FOR ITCHING.        There is no refill protocol information for this order        Recent Outpatient Visits              1 year ago Routine physical examination    [address] [place] [address] Sharda Boothe MD    Office Visit    1 year ago Visit for suture removal    [address] [place] [address] Sharda Boothe MD    Office Visit    1 year ago Tinnitus of both ears    [address], [facility] [address], [facility] Ilan Yang MD    Office Visit    1 year ago Allergic dermatosis    [id number] [place] [address] Sharda Boothe MD    Office Visit    1 year ago Tinnitus, bilateral    [address], [address], [place], [place]    Office Visit          Future Appointments         Provider Department Appt Notes    In 2 [place] [address] [facility], [address]

## 2023-11-25 ENCOUNTER — NURSE TRIAGE (OUTPATIENT)
Dept: FAMILY MEDICINE CLINIC | Facility: CLINIC | Age: 63
End: 2023-11-25

## 2023-11-25 NOTE — TELEPHONE ENCOUNTER
Action Requested: Summary for Provider     []  Critical Lab, Recommendations Needed  [] Need Additional Advice  [x]   FYI    []   Need Orders  [] Need Medications Sent to Pharmacy  []  Other     SUMMARY: patient calling to advise she was sitting reclined playing \"wordle\" on her cell about 15 minutes ago, she \"felt funny, tingling to only the right side of the face, finger's and she got up to go to the bathroom and felt \"slightly dizzy. \" She advised she worked for [employer] and tested herself for stroke protocol and is passing with arms lifted equally, same strength, no slurred speech and smile is equal. Advised per protocol and to go to ER. She agreed to this plan.      Reason for call: Tingling  Onset: Data Unavailable                   Reason for Disposition   Neurologic deficit that was brief (now gone), ANY of the following: * Weakness of the face, arm, or leg on one side of the body * Numbness of the face, arm, or leg on one side of the body * Loss of speech or garbled speech    Protocols used: Neurologic Deficit-A-[place]

## 2023-11-26 NOTE — TELEPHONE ENCOUNTER
Noted, received message from neuro that pt is at [facility] and did have CVA

## 2023-12-22 NOTE — TELEPHONE ENCOUNTER
Please review; protocol failed/ has no protocol    Please see message below for upcoming appointment.     Future Appointments   Date Time Provider Kristine Levyi   1/9/2024 11:15 AM Jennifer Gonzalez MD [facility]       Requested Prescriptions   Pending Prescriptions Disp Refills    HYDROXYZINE 25 MG Oral Tab [Pharmacy Med Name: HYDROXYZ HCL TAB 25MG] 180 tablet 0     Sig: TAKE 1 TABLET EVERY 8 HOURSAS NEEDED FOR ITCHING       There is no refill protocol information for this order        Recent Outpatient Visits              1 year ago Routine physical examination    [facility] Jennifer Gonzalez MD    Office Visit    1 year ago Visit for suture removal    [facility], [place] [address], [address] [place] [address] MD Milana    Office Visit    1 year ago Tinnitus of both ears    [employer] [place] [address], [facility] Uvaldo Diaz MD    Office Visit    1 year ago Allergic dermatosis    [facility], [place] [address], [facility] Jennifer Gonzalez MD    Office Visit    1 year ago Tinnitus, bilateral    [facility], [address] [facility],[address], [facility], Au.[place]    Office Visit          Future Appointments         Provider Department Appt Notes    In 2 weeks Jennifer Gonzalez MD [facility], [address]

## 2023-12-23 RX ORDER — HYDROXYZINE HYDROCHLORIDE 25 MG/1
25 TABLET, FILM COATED ORAL EVERY 8 HOURS PRN
Qty: 180 TABLET | Refills: 0 | Status: SHIPPED | OUTPATIENT
Start: 2023-12-23

## 2023-12-27 ENCOUNTER — TELEPHONE (OUTPATIENT)
Dept: FAMILY MEDICINE CLINIC | Facility: CLINIC | Age: 63
End: 2023-12-27

## 2023-12-27 NOTE — TELEPHONE ENCOUNTER
Spouse, states that the patient was discharged from Rehab 2 weeks ago and was told to follow up with her pcp. The first available appointment with Dr. Sachin Johns is not until 1-19-24. Spouse, would like to know if the doctor can see the patient sooner. There are no available appointments, would the doctor like to add the patient to the schedule? If so, which date and time.

## 2024-01-09 ENCOUNTER — OFFICE VISIT (OUTPATIENT)
Dept: FAMILY MEDICINE CLINIC | Facility: CLINIC | Age: 64
End: 2024-01-09

## 2024-01-09 VITALS
OXYGEN SATURATION: 98 % | DIASTOLIC BLOOD PRESSURE: 75 MMHG | BODY MASS INDEX: 22 KG/M2 | WEIGHT: 133 LBS | HEART RATE: 76 BPM | SYSTOLIC BLOOD PRESSURE: 117 MMHG | RESPIRATION RATE: 19 BRPM

## 2024-01-09 DIAGNOSIS — Z01.419 ENCOUNTER FOR GYNECOLOGICAL EXAMINATION WITHOUT ABNORMAL FINDING: Primary | ICD-10-CM

## 2024-01-09 DIAGNOSIS — I69.30 HISTORY OF ISCHEMIC CEREBROVASCULAR ACCIDENT (CVA) WITH RESIDUAL DEFICIT: ICD-10-CM

## 2024-01-09 DIAGNOSIS — Z12.11 COLON CANCER SCREENING: ICD-10-CM

## 2024-01-09 DIAGNOSIS — Z83.719 FAMILY HISTORY OF COLONIC POLYPS: ICD-10-CM

## 2024-01-09 DIAGNOSIS — Z00.00 ROUTINE PHYSICAL EXAMINATION: ICD-10-CM

## 2024-01-09 DIAGNOSIS — Z12.31 VISIT FOR SCREENING MAMMOGRAM: ICD-10-CM

## 2024-01-09 DIAGNOSIS — L30.9 NIPPLE DERMATITIS: ICD-10-CM

## 2024-01-09 DIAGNOSIS — R41.3 SHORT-TERM MEMORY LOSS: ICD-10-CM

## 2024-01-09 DIAGNOSIS — F43.22 ADJUSTMENT DISORDER WITH ANXIETY: ICD-10-CM

## 2024-01-09 DIAGNOSIS — I10 ESSENTIAL HYPERTENSION: ICD-10-CM

## 2024-01-09 PROCEDURE — 3074F SYST BP LT 130 MM HG: CPT | Performed by: FAMILY MEDICINE

## 2024-01-09 PROCEDURE — 99396 PREV VISIT EST AGE 40-64: CPT | Performed by: FAMILY MEDICINE

## 2024-01-09 PROCEDURE — 3078F DIAST BP <80 MM HG: CPT | Performed by: FAMILY MEDICINE

## 2024-01-09 PROCEDURE — 90471 IMMUNIZATION ADMIN: CPT | Performed by: FAMILY MEDICINE

## 2024-01-09 PROCEDURE — 90686 IIV4 VACC NO PRSV 0.5 ML IM: CPT | Performed by: FAMILY MEDICINE

## 2024-01-09 RX ORDER — ASPIRIN 81 MG/1
81 TABLET ORAL DAILY
COMMUNITY
Start: 2024-01-03

## 2024-01-09 RX ORDER — CLOPIDOGREL BISULFATE 75 MG/1
75 TABLET ORAL DAILY
COMMUNITY
Start: 2024-01-03 | End: 2024-01-25

## 2024-01-10 NOTE — PROGRESS NOTES
Subjective:   Patient ID: Lucy Wood is a 63 year old female.    Patient presents for routine physical and issues as below.    Breast Problem  This is a recurrent problem. The current episode started more than 1 month ago. The problem has been waxing and waning.   Neurologic Problem  The patient's primary symptoms include focal sensory loss and memory loss. This is a new problem. The current episode started more than 1 month ago. The neurological problem developed suddenly. The problem has been gradually improving since onset. There was left-sided focality noted.       History/Other:   Review of Systems   Constitutional: Negative.    Respiratory: Negative.     Cardiovascular: Negative.    Gastrointestinal: Negative.    Skin: Negative.    Neurological:         Left-sided pins-and-needles sensation.  Short-term memory loss.   Psychiatric/Behavioral:  Positive for memory loss.      Current Outpatient Medications   Medication Sig Dispense Refill    aspirin 81 MG Oral Tab EC Take 1 tablet (81 mg total) by mouth daily.      clopidogrel 75 MG Oral Tab Take 1 tablet (75 mg total) by mouth daily.      hydrOXYzine 25 MG Oral Tab Take 1 tablet (25 mg total) by mouth every 8 (eight) hours as needed for Itching. 180 tablet 0    lisinopril 10 MG Oral Tab Take 1 tablet (10 mg total) by mouth daily. 90 tablet 3    fexofenadine 180 MG Oral Tab Take 1 tablet (180 mg total) by mouth daily. 90 tablet 1    triamcinolone 0.1 % External Cream Apply topically 2 (two) times daily as needed. 80 g 1    Budesonide (PULMICORT FLEXHALER) 180 MCG/ACT Inhalation Aerosol Powder, Breath Activated Inhale 1 puff into the lungs 2 (two) times daily. 3 each 3    clotrimazole-betamethasone 1-0.05 % External Cream Apply 1 Application topically 2 (two) times daily as needed. 60 g 1    diazePAM 5 MG Oral Tab Take 1 tablet (5 mg total) by mouth every 6 (six) hours as needed for Anxiety. 20 tablet 0     Allergies:No Known Allergies    Objective:    Physical Exam  Constitutional:       Appearance: Normal appearance.   Cardiovascular:      Rate and Rhythm: Normal rate and regular rhythm.      Heart sounds: Normal heart sounds.   Pulmonary:      Effort: Pulmonary effort is normal.      Breath sounds: Normal breath sounds.      Comments: Left breast with scaly macule inferior areola.  No bleeding.  Otherwise normal appearance, no masses.  Right breast normal appearance, no masses.  Abdominal:      Palpations: Abdomen is soft. There is no mass.      Tenderness: There is no abdominal tenderness.   Lymphadenopathy:      Cervical: No cervical adenopathy.   Skin:     General: Skin is warm and dry.   Neurological:      Mental Status: She is alert.      Comments: See below         Assessment & Plan:   Encounter Diagnoses   Name Primary?    Encounter for gynecological examination without abnormal finding-patient is , 2 adult children, retired.  She is postmenopausal without spotting.  CVA affecting short-term memory as below  Pap smear done today  Reviewed recent labs  Flu vaccine given today  Had COVID 11/2023, recommend updated COVID booster 3/2024.   Yes    Routine physical examination-as above     Visit for screening mammogram-order given     Colon cancer screening-referred for colonoscopy     History of ischemic cerebrovascular accident (CVA) with residual deficit-11/25/2023 patient had onset of right sided pins-and-needles.  Went to ER, symptoms were mild and resolving.  She was admitted to hospital, and the following day developed short-term memory deficit, left-sided sensory symptoms.  MRI on admission severe narrowing at left vertebral artery origin, left P1/P2 junction occlusion.  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  Repeat MRI after above symptoms showed new right PCA territory infarct.  TTE, JOVAN and hyper coagulable workup unrevealing.  Holter without AF.  She did have COVID infection shortly before stroke.  She was discharged for  inpatient rehab 12/3/2023.  She is treated with aspirin and Plavix, Plavix ends 1/25/2024.  Since hospitalization she has had persistent sensory changes left face, arm, chest, lower extremity.  Short-term memory loss, intermittent anxiety, difficulty concentrating.   reports there may be some improvement in short-term memory since discharge.  She continues with ST, OT.     Family history of colonic polyps-as above     Essential hypertension-blood pressure well-controlled on current medication     Adjustment disorder with anxiety-significant anxiety and panic attacks since the stroke above.  Referred for therapy for stress reduction/relaxation/mindfulness techniques.  She will need written instructions to review when needed.  Discussed possibility of starting SSRI, patient and  deferred at this time.     Nipple dermatitis-rash left nipple.  Has occurred in the past, likely eczema but recommend breast surgery evaluation to rule out Paget's disease.  Triamcinolone ointment twice daily x 10 days     Short-term memory loss-as above.         Orders Placed This Encounter   Procedures    Fluzone Quadrivalent 6mo+ 0.5mL    ThinPrep PAP with HPV Reflex Request B       Meds This Visit:  Requested Prescriptions      No prescriptions requested or ordered in this encounter       Imaging & Referrals:  SURGERY - INTERNAL  INFLUENZA VAC, QUAD, PRSV FREE, 0.5 ML  OP REFERRAL TO [facility] 2D+3D SCREENING BILAT (CPT=77067/11968)  OP REFERRAL TO [facility] GI TELEPHONE COLON SCREEN

## 2024-01-22 LAB — HPV I/H RISK 1 DNA SPEC QL NAA+PROBE: NEGATIVE

## 2024-02-06 ENCOUNTER — HOSPITAL ENCOUNTER (OUTPATIENT)
Age: 64
Discharge: ANOTHER HOSPITAL | End: 2024-02-06
Payer: COMMERCIAL

## 2024-02-06 ENCOUNTER — NURSE TRIAGE (OUTPATIENT)
Dept: FAMILY MEDICINE CLINIC | Facility: CLINIC | Age: 64
End: 2024-02-06

## 2024-02-06 VITALS
RESPIRATION RATE: 16 BRPM | HEART RATE: 72 BPM | DIASTOLIC BLOOD PRESSURE: 76 MMHG | OXYGEN SATURATION: 98 % | TEMPERATURE: 98 F | SYSTOLIC BLOOD PRESSURE: 145 MMHG

## 2024-02-06 DIAGNOSIS — R42 DIZZINESS: Primary | ICD-10-CM

## 2024-02-06 PROCEDURE — 99204 OFFICE O/P NEW MOD 45 MIN: CPT | Performed by: NURSE PRACTITIONER

## 2024-02-06 NOTE — ED INITIAL ASSESSMENT (HPI)
Pt states she was walking and had 3 brief episodes of dizziness. Denies dizziness at this time. Denies syncope/chest pain. Pt denies bilat ear pain but does have a ringing in her ears. No facial droop/no slurred speech. Hand grasps strong/equal bilat. Ambulates without difficulty.

## 2024-02-06 NOTE — TELEPHONE ENCOUNTER
Action Requested: Summary for Provider     []  Critical Lab, Recommendations Needed  [] Need Additional Advice  [x]   FYI    []   Need Orders  [] Need Medications Sent to Pharmacy  []  Other     SUMMARY:    Will proceed to the urgent care  No appointments noted.    The patient stated has a history of a small stroke in November.  She is unable to determine pain but has since November left side tingling.    2 times today she has experienced dizziness.  Last one 20 minutes ago.   She stated her ear is muffled and believes she has a inner ear infection.    Denies chest pains, shortness of breath, fever, does not know if has pain, dry mouth.   She is voiding.    Her  has left medication/drops for the ear.   Instructed she should not take other medication with understanding    Reason for call: Dizziness  Onset: Data Unavailable      General Assessment (questions to ask the caller)  Do you consider this a medical emergency?: No  Can you access 911?: Yes  Do you have any pain?: No  Do you have a fever?: No  What is the date of your last medical exam?: 01/09/24  Additional Assessments  Are these symptoms new, recurrent, or chronic?: new (last 20 minutes)              Reason for Disposition   Patient wants to be seen    Protocols used: Dizziness-A-OH

## 2024-02-07 NOTE — ED PROVIDER NOTES
Patient Seen in: [facility]      History     Chief Complaint   Patient presents with    Dizziness     Stated Complaint: Ear Problem    Subjective:   HPI  Patient is a 63-year-old female with asthma and hypertension.  She was treated 10 weeks ago for an acute ischemic stroke.  She presents to the immediate care center with concern for dizziness.  She was walking this afternoon when she felt acute episode of dizziness \"like I was off balance and things were spinning.\"  This happened 2 or 3 times in short succession.  This is not something she is familiar with.  She states at that time it was significant enough she had to hold onto her daughter to keep from falling.  She is here today, concerned that she may have had an inner ear infection, despite the fact she has had no upper respiratory symptoms and has had no ear pain throughout.  She denies recent illness; denies recent injury.  She does acknowledge that after her stroke, her neurologist encouraged her to take Plavix for a short period of time.  She has not been taking the Plavix for several weeks.    She does have history of SVT and has scheduled procedure with her cardiologist at the end of the week to have a loop recorder implanted for continued cardiac monitoring.  She states she has not had recent chest pain, shortness of air, or palpitations.        Objective:   Past Medical History:   Diagnosis Date    Asthma     Colon polyp 06/2018    repeat CLN in 6/2023    High blood pressure     Left knee pain 04/2012    XRAY 04/02/2012    SCC (squamous cell carcinoma), face 11/16/2022              Past Surgical History:   Procedure Laterality Date    COLONOSCOPY  2005    father colonic polyps; fam hx colon cancer    COLONOSCOPY  03/03/2011    repeat in 2016    COLONOSCOPY N/A 6/27/2018    Procedure: COLONOSCOPY;  Surgeon: BENJAMIN Drummond MD;  Location: [facility] ENDOSCOPY    EXCISE HAND/FOOT NEUROMA Left 06/01/2015    Osorio's neuroma removed, left foot                 Social History     Socioeconomic History    Marital status:    Tobacco Use    Smoking status: Former     Packs/day: 1.00     Years: 10.00     Additional pack years: 0.00     Total pack years: 10.00     Types: Cigarettes     Quit date: 1984     Years since quittin.7    Smokeless tobacco: Never   Vaping Use    Vaping Use: Never used   Substance and Sexual Activity    Alcohol use: Yes     Comment: wine, 2 drinks weekly    Drug use: No   Other Topics Concern    Caffeine Concern Yes     Comment: tea, 1 cup daily    Left Handed Yes    Right Handed No    Currently spends a great deal of time in the sun No    History of tanning No    Hx of Spending Great Deal of Time in Sun No    Bad sunburns in the past Yes    Tanning Salons in the Past No    Hx of Radiation Treatments No              Review of Systems   Constitutional:  Negative for appetite change, chills and fever.   Eyes:  Negative for visual disturbance.   Respiratory:  Negative for cough and shortness of breath.    Cardiovascular:  Negative for chest pain.   Gastrointestinal:  Negative for nausea.   Skin:  Negative for rash.   Neurological:  Positive for dizziness and numbness (She had residual left-sided paresthesia since her recent stroke.  She states this is not changed recently.). Negative for weakness and headaches.       Positive for stated complaint: Ear Problem  Other systems are as noted in HPI.  Constitutional and vital signs reviewed.      All other systems reviewed and negative except as noted above.    Physical Exam     ED Triage Vitals [24 1614]   /76   Pulse 72   Resp 16   Temp 98.2 °F (36.8 °C)   Temp src Oral   SpO2 98 %   O2 Device None (Room air)       Current:/76   Pulse 72   Temp 98.2 °F (36.8 °C) (Oral)   Resp 16   SpO2 98%         Physical Exam  Vitals and nursing note reviewed.   HENT:      Head: Normocephalic and atraumatic.      Right Ear: Tympanic membrane and ear canal normal.      Left  Ear: Tympanic membrane and ear canal normal.   Eyes:      Extraocular Movements: Extraocular movements intact.      Pupils: Pupils are equal, round, and reactive to light.   Cardiovascular:      Rate and Rhythm: Regular rhythm.   Pulmonary:      Effort: Pulmonary effort is normal. No respiratory distress.   Musculoskeletal:      Cervical back: Normal range of motion and neck supple.   Neurological:      Mental Status: She is alert.      GCS: GCS eye subscore is 4. GCS verbal subscore is 5. GCS motor subscore is 6.      Cranial Nerves: No cranial nerve deficit, dysarthria or facial asymmetry.      Sensory: No sensory deficit.      Motor: No weakness.      Coordination: Romberg sign negative. Coordination normal.      Gait: Gait normal.   Psychiatric:         Behavior: Behavior normal.               ED Course   Labs Reviewed - No data to display                   [facility]      Jonathan diagnoses (listed below) reviewed with patient and  at bedside prior to disposition.  They were informed that because of her significant, recent past medical history, there is concern that this dizziness may be related to acute neurologic pathology.  She was informed of limited resources in the immediate care center to consider these differentials.  For this reason, they were asked to go directly from here to the emergency department for further evaluation and treatment as may be indicated.  They both state understanding and agree with plan.                                 Medical Decision Making  Differential diagnoses considered today include, but are not exclusive of: Acute cerebrovascular accident; transient ischemic attack; dysrhythmia; vertigo; acute coronary syndrome; anemia; electrolyte imbalance.    Problems Addressed:  Dizziness: undiagnosed new problem with uncertain prognosis        Disposition and Plan     Clinical Impression:  1. Dizziness         Disposition:  Ic to ed  2/6/2024  4:44 pm    Follow-up:  No follow-up  provider specified.        Medications Prescribed:  Discharge Medication List as of 2/6/2024  4:45 PM

## 2024-02-07 NOTE — TELEPHONE ENCOUNTER
ELIZA Zazueta [facility] and they did do [facility] cat scan and everything came back normal. Pt does not feel dizzy anymore.  Pt has a f/u appt to see you on 2/13/24. Due to pt having other appt this would be the soonest appt they can make it to.     Future Appointments   Date Time Provider Department Center   2/13/2024  5:00 PM Lucy Zazueta MD [facility]

## 2024-02-13 ENCOUNTER — OFFICE VISIT (OUTPATIENT)
Dept: FAMILY MEDICINE CLINIC | Facility: CLINIC | Age: 64
End: 2024-02-13

## 2024-02-13 VITALS
WEIGHT: 128.81 LBS | HEIGHT: 66 IN | BODY MASS INDEX: 20.7 KG/M2 | DIASTOLIC BLOOD PRESSURE: 79 MMHG | SYSTOLIC BLOOD PRESSURE: 143 MMHG | RESPIRATION RATE: 18 BRPM | HEART RATE: 97 BPM

## 2024-02-13 DIAGNOSIS — R42 DIZZINESS: Primary | ICD-10-CM

## 2024-02-13 DIAGNOSIS — F43.23 ADJUSTMENT DISORDER WITH MIXED ANXIETY AND DEPRESSED MOOD: ICD-10-CM

## 2024-02-13 DIAGNOSIS — I10 ESSENTIAL HYPERTENSION: ICD-10-CM

## 2024-02-13 PROCEDURE — 3077F SYST BP >= 140 MM HG: CPT | Performed by: FAMILY MEDICINE

## 2024-02-13 PROCEDURE — 3078F DIAST BP <80 MM HG: CPT | Performed by: FAMILY MEDICINE

## 2024-02-13 PROCEDURE — 3008F BODY MASS INDEX DOCD: CPT | Performed by: FAMILY MEDICINE

## 2024-02-13 PROCEDURE — 99214 OFFICE O/P EST MOD 30 MIN: CPT | Performed by: FAMILY MEDICINE

## 2024-02-13 RX ORDER — LISINOPRIL 5 MG/1
5 TABLET ORAL DAILY
Qty: 90 TABLET | Refills: 3 | Status: SHIPPED | OUTPATIENT
Start: 2024-02-13

## 2024-02-13 RX ORDER — ATORVASTATIN CALCIUM 40 MG/1
40 TABLET, FILM COATED ORAL NIGHTLY
Qty: 90 TABLET | Refills: 3 | Status: SHIPPED | OUTPATIENT
Start: 2024-02-13

## 2024-02-13 RX ORDER — ESCITALOPRAM OXALATE 5 MG/1
5 TABLET ORAL DAILY
Qty: 90 TABLET | Refills: 1 | Status: SHIPPED | OUTPATIENT
Start: 2024-02-13

## 2024-02-14 NOTE — PROGRESS NOTES
Subjective:   Patient ID: Lucy Wood is a 63 year old female.    Patient presents for follow-up on ER visit for dizziness and issues as below.    Dizziness    Medication Problem        History/Other:   Review of Systems   Neurological:  Positive for dizziness.     Current Outpatient Medications   Medication Sig Dispense Refill    atorvastatin 40 MG Oral Tab Take 1 tablet (40 mg total) by mouth nightly. 90 tablet 3    lisinopril 5 MG Oral Tab Take 1 tablet (5 mg total) by mouth daily. 90 tablet 3    escitalopram (LEXAPRO) 5 MG Oral Tab Take 1 tablet (5 mg total) by mouth daily. 90 tablet 1    aspirin 81 MG Oral Tab EC Take 1 tablet (81 mg total) by mouth daily.      Budesonide (PULMICORT FLEXHALER) 180 MCG/ACT Inhalation Aerosol Powder, Breath Activated Inhale 1 puff into the lungs 2 (two) times daily. 3 each 3    clotrimazole-betamethasone 1-0.05 % External Cream Apply 1 Application topically 2 (two) times daily as needed. 60 g 1    diazePAM 5 MG Oral Tab Take 1 tablet (5 mg total) by mouth every 6 (six) hours as needed for Anxiety. 20 tablet 0     Allergies:No Known Allergies    Objective:   Physical Exam  Constitutional:       Appearance: Normal appearance.   Cardiovascular:      Rate and Rhythm: Normal rate and regular rhythm.      Pulses: Normal pulses.      Heart sounds: Normal heart sounds.   Pulmonary:      Effort: Pulmonary effort is normal.      Breath sounds: Normal breath sounds.   Musculoskeletal:      Right lower leg: No edema.      Left lower leg: No edema.   Neurological:      Mental Status: She is alert.      Comments: Extraocular movements intact, no nystagmus.  Romberg negative  [place]-Hallpike maneuver negative.           Assessment & Plan:   1. Dizziness-on 2/6 patient was walking with her daughter when she experienced intense vertigo/dizziness.  Had to hold on.  Symptoms resolved.  Seen in ER.  CT negative.  It was recommended she follow-up with neurology regarding further imaging.  Previous  imaging ordered by neurology has been denied by insurance.  Over the past week she has had occasional mild dizziness intermittently.  On exam today no nystagmus, Jae-Hallpike negative.  Romberg negative.  Recommend contact neurology Dr. Seo's staff.  Check on preauthorization for previously ordered imaging and check to see whether additional imaging recommended.  Additionally, atorvastatin 40 mg daily started during CVA hospitalization 2 months ago.  Refills sent to pharmacy today.  Continue daily aspirin, has completed recommended course of Plavix.   2. Essential hypertension-blood pressure mildly elevated at immediate care and here today.  Plan to start lisinopril 5 mg daily reviewed instructions and side effects.  They will check home blood pressures about twice a week and bring to next appointment.   3. Adjustment disorder with mixed anxiety and depressed mood-see previous visit.  Has not made contact with behavioral health navigator.  Tearfulness has worsened past 2 weeks.  Scheduled with [facility] navigator.  Plan to start Lexapro 5 mg daily reviewed instructions and side effects.       No orders of the defined types were placed in this encounter.      Meds This Visit:  Requested Prescriptions     Signed Prescriptions Disp Refills    atorvastatin 40 MG Oral Tab 90 tablet 3     Sig: Take 1 tablet (40 mg total) by mouth nightly.    lisinopril 5 MG Oral Tab 90 tablet 3     Sig: Take 1 tablet (5 mg total) by mouth daily.    escitalopram (LEXAPRO) 5 MG Oral Tab 90 tablet 1     Sig: Take 1 tablet (5 mg total) by mouth daily.       Imaging & Referrals:  None

## 2024-04-15 ENCOUNTER — OFFICE VISIT (OUTPATIENT)
Dept: FAMILY MEDICINE CLINIC | Facility: CLINIC | Age: 64
End: 2024-04-15

## 2024-04-15 VITALS
DIASTOLIC BLOOD PRESSURE: 66 MMHG | BODY MASS INDEX: 21 KG/M2 | HEART RATE: 75 BPM | SYSTOLIC BLOOD PRESSURE: 100 MMHG | WEIGHT: 129.19 LBS

## 2024-04-15 DIAGNOSIS — F43.23 ADJUSTMENT DISORDER WITH MIXED ANXIETY AND DEPRESSED MOOD: ICD-10-CM

## 2024-04-15 DIAGNOSIS — I69.30 HISTORY OF ISCHEMIC CEREBROVASCULAR ACCIDENT (CVA) WITH RESIDUAL DEFICIT: ICD-10-CM

## 2024-04-15 DIAGNOSIS — I10 ESSENTIAL HYPERTENSION: Primary | ICD-10-CM

## 2024-04-15 PROCEDURE — 3074F SYST BP LT 130 MM HG: CPT | Performed by: FAMILY MEDICINE

## 2024-04-15 PROCEDURE — 99213 OFFICE O/P EST LOW 20 MIN: CPT | Performed by: FAMILY MEDICINE

## 2024-04-15 PROCEDURE — 3078F DIAST BP <80 MM HG: CPT | Performed by: FAMILY MEDICINE

## 2024-04-15 RX ORDER — LISINOPRIL 2.5 MG/1
2.5 TABLET ORAL DAILY
Qty: 90 TABLET | Refills: 3 | Status: SHIPPED | OUTPATIENT
Start: 2024-04-15

## 2024-04-15 NOTE — PROGRESS NOTES
Subjective:   Patient ID: Lucy Wood is a 63 year old female.    Patient presents to follow-up on hypertension, mood issues, and CVA sequela as below.        History/Other:   Review of Systems  Current Outpatient Medications   Medication Sig Dispense Refill    lisinopril 2.5 MG Oral Tab Take 1 tablet (2.5 mg total) by mouth daily. 90 tablet 3    atorvastatin 40 MG Oral Tab Take 1 tablet (40 mg total) by mouth nightly. 90 tablet 3    escitalopram (LEXAPRO) 5 MG Oral Tab Take 1 tablet (5 mg total) by mouth daily. 90 tablet 1    aspirin 81 MG Oral Tab EC Take 1 tablet (81 mg total) by mouth daily.      Budesonide (PULMICORT FLEXHALER) 180 MCG/ACT Inhalation Aerosol Powder, Breath Activated Inhale 1 puff into the lungs 2 (two) times daily. 3 each 3    clotrimazole-betamethasone 1-0.05 % External Cream Apply 1 Application topically 2 (two) times daily as needed. 60 g 1    diazePAM 5 MG Oral Tab Take 1 tablet (5 mg total) by mouth every 6 (six) hours as needed for Anxiety. (Patient not taking: Reported on 4/15/2024) 20 tablet 0     Allergies:No Known Allergies    Objective:   Physical Exam  Constitutional:       Appearance: Normal appearance.   Cardiovascular:      Rate and Rhythm: Normal rate and regular rhythm.      Pulses: Normal pulses.      Heart sounds: Normal heart sounds.   Pulmonary:      Effort: Pulmonary effort is normal.      Breath sounds: Normal breath sounds.   Musculoskeletal:      Right lower leg: No edema.      Left lower leg: No edema.   Neurological:      Mental Status: She is alert.         Assessment & Plan:   1. Essential hypertension-patient taking lisinopril 5 mg daily.  Today blood pressure on recheck 102/66.  With history of CVA recommend changing lisinopril to 2.5 mg daily.  They will check home blood pressures and call with readings in 1 month.  Follow-up with me in 2 months.   2. Adjustment disorder with mixed anxiety and depressed mood-Lexapro dose prescribed at last visit was  discontinued as it seemed to be increasing anxiety initially.  Plan to restart Lexapro lower dose, 2.5 mg daily for 1 week then 5 mg daily.  Recently had episode of anxiety associated with mammogram, recommend rescheduling and may use Valium 1 hour prior to procedure.  Reviewed side effects/precautions.  They received referrals for therapy, have not scheduled as patient was initially feeling overwhelmed by appointments.  They may go ahead with it at this time.   3. History of ischemic cerebrovascular accident (CVA) with residual deficit-11/2024 with MRI showing right-sided PCA territory infarct.  MRA revealed P1-P2 occlusion, and severe narrowing left vertebral artery origin.  Followed by neurology, Dr. Seo, was referred to neurosurgery, Dr. Canales, for assessment of 4 x 4 mm right-sided supraclinoid carotid artery aneurysm.  They elected for follow-up imaging in 6 months.       No orders of the defined types were placed in this encounter.      Meds This Visit:  Requested Prescriptions     Signed Prescriptions Disp Refills    lisinopril 2.5 MG Oral Tab 90 tablet 3     Sig: Take 1 tablet (2.5 mg total) by mouth daily.       Imaging & Referrals:  None

## 2024-04-18 ENCOUNTER — HOSPITAL ENCOUNTER (OUTPATIENT)
Dept: MAMMOGRAPHY | Age: 64
Discharge: HOME | End: 2024-04-18
Attending: FAMILY MEDICINE
Payer: COMMERCIAL

## 2024-04-18 DIAGNOSIS — Z12.31 VISIT FOR SCREENING MAMMOGRAM: ICD-10-CM

## 2024-04-18 PROCEDURE — 77067 SCR MAMMO BI INCL CAD: CPT | Performed by: FAMILY MEDICINE

## 2024-04-18 PROCEDURE — 77063 BREAST TOMOSYNTHESIS BI: CPT | Performed by: FAMILY MEDICINE

## 2024-06-17 ENCOUNTER — OFFICE VISIT (OUTPATIENT)
Dept: FAMILY MEDICINE CLINIC | Facility: CLINIC | Age: 64
End: 2024-06-17

## 2024-06-17 VITALS
DIASTOLIC BLOOD PRESSURE: 58 MMHG | TEMPERATURE: 98 F | OXYGEN SATURATION: 96 % | SYSTOLIC BLOOD PRESSURE: 91 MMHG | HEART RATE: 77 BPM | WEIGHT: 128 LBS | BODY MASS INDEX: 20.57 KG/M2 | HEIGHT: 66.14 IN

## 2024-06-17 DIAGNOSIS — I10 ESSENTIAL HYPERTENSION: Primary | ICD-10-CM

## 2024-06-17 DIAGNOSIS — F43.23 ADJUSTMENT DISORDER WITH MIXED ANXIETY AND DEPRESSED MOOD: ICD-10-CM

## 2024-06-17 DIAGNOSIS — M22.2X9 PATELLOFEMORAL STRESS SYNDROME, UNSPECIFIED LATERALITY: ICD-10-CM

## 2024-06-17 DIAGNOSIS — M26.629 TMJ SYNDROME: ICD-10-CM

## 2024-06-17 PROCEDURE — 3074F SYST BP LT 130 MM HG: CPT | Performed by: FAMILY MEDICINE

## 2024-06-17 PROCEDURE — 3008F BODY MASS INDEX DOCD: CPT | Performed by: FAMILY MEDICINE

## 2024-06-17 PROCEDURE — 3078F DIAST BP <80 MM HG: CPT | Performed by: FAMILY MEDICINE

## 2024-06-17 PROCEDURE — G2211 COMPLEX E/M VISIT ADD ON: HCPCS | Performed by: FAMILY MEDICINE

## 2024-06-17 PROCEDURE — 99213 OFFICE O/P EST LOW 20 MIN: CPT | Performed by: FAMILY MEDICINE

## 2024-07-29 ENCOUNTER — TELEPHONE (OUTPATIENT)
Dept: FAMILY MEDICINE CLINIC | Facility: CLINIC | Age: 64
End: 2024-07-29

## 2024-07-29 NOTE — TELEPHONE ENCOUNTER
From patient's spouse:  Dr. Zazueta here are three readings after zeenat was off lisinopril for a month. 7/23 117/77 71. 7/25 112/72 72. 7/29 109/68 71.     Per Dr. Zazueta:  Transition this message to patient's wife's chart, Zeenat Wood.  Let him [] know we will continue her off lisinopril, take this off her medication list if not already done.  She had CVA 11/2023, subsequent severe memory impairment, he [] should have access to her MyChart as he now monitors her medical affairs.  Please help to establish this.

## 2024-07-31 NOTE — TELEPHONE ENCOUNTER
Called patient's spouse.  Advised of Dr. Zazueta's message regarding lisinopril.  Spouse states patient feels fine with her current blood pressures.      Advised spouse that he can bring POA documents to the office to get access to patient's MyChart.  Explained that we are not able to reply to his [employer] messages in his chart regarding patient.  Patient verbalized understanding.

## 2024-11-13 ENCOUNTER — IMMUNIZATION (OUTPATIENT)
Dept: LAB | Age: 64
End: 2024-11-13
Attending: EMERGENCY MEDICINE

## 2024-11-13 DIAGNOSIS — Z23 NEED FOR VACCINATION: Primary | ICD-10-CM

## 2024-11-13 PROCEDURE — 90480 ADMN SARSCOV2 VAC 1/ONLY CMP: CPT

## 2025-02-10 RX ORDER — ATORVASTATIN CALCIUM 40 MG/1
40 TABLET, FILM COATED ORAL NIGHTLY
Qty: 90 TABLET | Refills: 3 | Status: SHIPPED | OUTPATIENT
Start: 2025-02-10

## 2025-02-10 NOTE — TELEPHONE ENCOUNTER
Please review; protocol failed/No Protocol    No Active/Future labs pended     Requested Prescriptions   Pending Prescriptions Disp Refills    ATORVASTATIN 40 MG Oral Tab [Pharmacy Med Name: atorvastatin 40 mg tablet] 90 tablet 3     Sig: Take 1 tablet (40 mg total) by mouth nightly.       Cholesterol Medication Protocol Failed - 2/10/2025  4:12 PM        Failed - ALT < 80     No results found for: \"ALT\"          Failed - ALT resulted within past year        Failed - Lipid panel within past 12 months     Lab Results   Component Value Date    CHOLEST 221 (H) 09/30/2022    TRIG 114 09/30/2022    HDL 57 09/30/2022     (H) 09/30/2022    VLDL 21 09/30/2022    NONHDLC 164 (H) 09/30/2022             Passed - In person appointment or virtual visit in the past 12 mos or appointment in next 3 mos     Recent Outpatient Visits              7 months ago Essential hypertension    [facility] Lucy Black MD    Office Visit    10 months ago Essential hypertension    [facility]    [facility] Visit    12 months ago Dizziness    [facility]    [facility] Visit    1 year ago Encounter for gynecological examination without abnormal finding    [facility]    Office Visit    2 years ago Routine physical examination    [facility]    [facility]                      Passed - Medication is active on med list             Recent Outpatient Visits              7 months ago Essential hypertension    [facility]    Office Visit    10 months ago Essential hypertension    [facility]    Office Visit    12 months ago Dizziness    [facility] [place], Mary C, MD    Office Visit    1 year ago Encounter for gynecological examination without abnormal finding    [facility] [place], Mary C, MD    Office Visit    2 years [facility] [place] [facility]    [facility] Visit

## 2025-06-20 ENCOUNTER — OFFICE VISIT (OUTPATIENT)
Dept: FAMILY MEDICINE CLINIC | Facility: CLINIC | Age: 65
End: 2025-06-20

## 2025-06-20 VITALS
HEART RATE: 64 BPM | WEIGHT: 138 LBS | BODY MASS INDEX: 22.18 KG/M2 | SYSTOLIC BLOOD PRESSURE: 113 MMHG | DIASTOLIC BLOOD PRESSURE: 69 MMHG | HEIGHT: 66.14 IN | OXYGEN SATURATION: 99 %

## 2025-06-20 DIAGNOSIS — M70.61 TROCHANTERIC BURSITIS OF RIGHT HIP: Primary | ICD-10-CM

## 2025-06-20 PROCEDURE — 99214 OFFICE O/P EST MOD 30 MIN: CPT | Performed by: FAMILY MEDICINE

## 2025-06-20 PROCEDURE — 3074F SYST BP LT 130 MM HG: CPT | Performed by: FAMILY MEDICINE

## 2025-06-20 PROCEDURE — 3008F BODY MASS INDEX DOCD: CPT | Performed by: FAMILY MEDICINE

## 2025-06-20 PROCEDURE — 3078F DIAST BP <80 MM HG: CPT | Performed by: FAMILY MEDICINE

## 2025-06-20 RX ORDER — MELOXICAM 15 MG/1
15 TABLET ORAL DAILY
Qty: 30 TABLET | Refills: 0 | Status: SHIPPED | OUTPATIENT
Start: 2025-06-20

## 2025-06-20 NOTE — PROGRESS NOTES
Subjective:   Lucy Wood is a 64 year old female who presents for Hip Pain (Right hip pain /10 pain )       History/Other:    Chief Complaint Reviewed and Verified  Nursing Notes Reviewed and   Verified  Allergies Reviewed  Medications Reviewed         Tobacco:  She smoked tobacco in the past but quit greater than 12 months ago.  Tobacco Use[1]     Current Medications[2]      Review of Systems:  Review of Systems   Constitutional: Negative.    Musculoskeletal:  Positive for arthralgias.        Right lateral hip area pain         Objective:   /69   Pulse 64   Ht 5' 6.14\" (1.68 m)   Wt 138 lb (62.6 kg)   LMP  (LMP Unknown)   SpO2 99%   BMI 22.18 kg/m²  Estimated body mass index is 22.18 kg/m² as calculated from the following:    Height as of this encounter: 5' 6.14\" (1.68 m).    Weight as of this encounter: 138 lb (62.6 kg).  Physical Exam  Vitals reviewed.   Musculoskeletal:      Right hip: Tenderness present. Normal range of motion.      Comments: Right greater trochanter tenderness           Assessment & Plan:   1. Trochanteric bursitis of right hip (Primary)  Other orders  -     Meloxicam; Take 1 tablet (15 mg total) by mouth daily.  Dispense: 30 tablet; Refill: 0  Demonstrated home exercises, mobic short term two weeks then prn .  Discussed risk due to ASA use.  No GI issues.  Short term use intended.  Ice daily.  If not better in three weeks will start PT      No follow-ups on file.    Timur Hernández DO, 2025, 3:10 PM        [1]   Social History  Tobacco Use   Smoking Status Former    Current packs/day: 0.00    Average packs/day: 1 pack/day for 10.0 years (10.0 ttl pk-yrs)    Types: Cigarettes    Start date: 1974    Quit date: 1984    Years since quittin.1   Smokeless Tobacco Never   [2]   Current Outpatient Medications   Medication Sig Dispense Refill    Meloxicam 15 MG Oral Tab Take 1 tablet (15 mg total) by mouth daily. 30 tablet 0    atorvastatin 40 MG Oral Tab Take  1 tablet (40 mg total) by mouth nightly. 90 tablet 3    aspirin 81 MG Oral Tab EC Take 1 tablet (81 mg total) by mouth daily.      Budesonide (PULMICORT FLEXHALER) 180 MCG/ACT Inhalation Aerosol Powder, Breath Activated Inhale 1 puff into the lungs 2 (two) times daily. (Patient not taking: Reported on 6/20/2025) 3 each 3

## 2025-07-08 ENCOUNTER — LAB ENCOUNTER (OUTPATIENT)
Dept: LAB | Age: 65
End: 2025-07-08
Attending: FAMILY MEDICINE
Payer: MEDICARE

## 2025-07-08 ENCOUNTER — OFFICE VISIT (OUTPATIENT)
Dept: FAMILY MEDICINE CLINIC | Facility: CLINIC | Age: 65
End: 2025-07-08
Payer: COMMERCIAL

## 2025-07-08 VITALS
HEART RATE: 63 BPM | WEIGHT: 139 LBS | SYSTOLIC BLOOD PRESSURE: 123 MMHG | BODY MASS INDEX: 22.34 KG/M2 | HEIGHT: 66 IN | OXYGEN SATURATION: 96 % | DIASTOLIC BLOOD PRESSURE: 70 MMHG

## 2025-07-08 DIAGNOSIS — I69.30 HISTORY OF ISCHEMIC CEREBROVASCULAR ACCIDENT (CVA) WITH RESIDUAL DEFICIT: ICD-10-CM

## 2025-07-08 DIAGNOSIS — J45.20 MILD INTERMITTENT ASTHMA WITHOUT COMPLICATION (HCC): ICD-10-CM

## 2025-07-08 DIAGNOSIS — I47.29 NSVT (NONSUSTAINED VENTRICULAR TACHYCARDIA) (HCC): ICD-10-CM

## 2025-07-08 DIAGNOSIS — I10 HYPERTENSION, BENIGN: ICD-10-CM

## 2025-07-08 DIAGNOSIS — Q43.8 TORTUOUS COLON: ICD-10-CM

## 2025-07-08 DIAGNOSIS — Z12.11 COLON CANCER SCREENING: ICD-10-CM

## 2025-07-08 DIAGNOSIS — Z12.31 VISIT FOR SCREENING MAMMOGRAM: ICD-10-CM

## 2025-07-08 DIAGNOSIS — Z83.719 FAMILY HISTORY OF COLONIC POLYPS: ICD-10-CM

## 2025-07-08 DIAGNOSIS — Z00.00 ENCOUNTER FOR ANNUAL HEALTH EXAMINATION: Primary | ICD-10-CM

## 2025-07-08 DIAGNOSIS — H91.92 HEARING LOSS OF LEFT EAR, UNSPECIFIED HEARING LOSS TYPE: ICD-10-CM

## 2025-07-08 DIAGNOSIS — R41.3 SHORT-TERM MEMORY LOSS: ICD-10-CM

## 2025-07-08 PROBLEM — Z85.828 HISTORY OF SQUAMOUS CELL CARCINOMA OF SKIN: Status: ACTIVE | Noted: 2025-07-08

## 2025-07-08 PROBLEM — C44.320 SCC (SQUAMOUS CELL CARCINOMA), FACE: Status: RESOLVED | Noted: 2022-11-16 | Resolved: 2025-07-08

## 2025-07-08 LAB
ALBUMIN SERPL-MCNC: 4.8 G/DL (ref 3.2–4.8)
ALBUMIN/GLOB SERPL: 2 {RATIO} (ref 1–2)
ALP LIVER SERPL-CCNC: 87 U/L (ref 50–130)
ALT SERPL-CCNC: 17 U/L (ref 10–49)
ANION GAP SERPL CALC-SCNC: 6 MMOL/L (ref 0–18)
AST SERPL-CCNC: 20 U/L (ref ?–34)
BILIRUB SERPL-MCNC: 1.2 MG/DL (ref 0.2–1.1)
BUN BLD-MCNC: 17 MG/DL (ref 9–23)
BUN/CREAT SERPL: 19.3 (ref 10–20)
CALCIUM BLD-MCNC: 9.6 MG/DL (ref 8.7–10.4)
CHLORIDE SERPL-SCNC: 106 MMOL/L (ref 98–112)
CHOLEST SERPL-MCNC: 110 MG/DL (ref ?–200)
CO2 SERPL-SCNC: 26 MMOL/L (ref 21–32)
CREAT BLD-MCNC: 0.88 MG/DL (ref 0.55–1.02)
DEPRECATED RDW RBC AUTO: 46.5 FL (ref 35.1–46.3)
EGFRCR SERPLBLD CKD-EPI 2021: 73 ML/MIN/1.73M2 (ref 60–?)
ERYTHROCYTE [DISTWIDTH] IN BLOOD BY AUTOMATED COUNT: 13.3 % (ref 11–15)
FASTING PATIENT LIPID ANSWER: NO
FASTING STATUS PATIENT QL REPORTED: NO
GLOBULIN PLAS-MCNC: 2.4 G/DL (ref 2–3.5)
GLUCOSE BLD-MCNC: 87 MG/DL (ref 70–99)
HCT VFR BLD AUTO: 39 % (ref 35–48)
HDLC SERPL-MCNC: 53 MG/DL (ref 40–59)
HGB BLD-MCNC: 12.6 G/DL (ref 12–16)
LDLC SERPL CALC-MCNC: 40 MG/DL (ref ?–100)
MCH RBC QN AUTO: 30.4 PG (ref 26–34)
MCHC RBC AUTO-ENTMCNC: 32.3 G/DL (ref 31–37)
MCV RBC AUTO: 94.2 FL (ref 80–100)
NONHDLC SERPL-MCNC: 57 MG/DL (ref ?–130)
OSMOLALITY SERPL CALC.SUM OF ELEC: 287 MOSM/KG (ref 275–295)
PLATELET # BLD AUTO: 232 10(3)UL (ref 150–450)
POTASSIUM SERPL-SCNC: 4 MMOL/L (ref 3.5–5.1)
PROT SERPL-MCNC: 7.2 G/DL (ref 5.7–8.2)
RBC # BLD AUTO: 4.14 X10(6)UL (ref 3.8–5.3)
SODIUM SERPL-SCNC: 138 MMOL/L (ref 136–145)
TRIGL SERPL-MCNC: 86 MG/DL (ref 30–149)
VLDLC SERPL CALC-MCNC: 12 MG/DL (ref 0–30)
WBC # BLD AUTO: 5.2 X10(3) UL (ref 4–11)

## 2025-07-08 PROCEDURE — 80061 LIPID PANEL: CPT

## 2025-07-08 PROCEDURE — 85027 COMPLETE CBC AUTOMATED: CPT

## 2025-07-08 PROCEDURE — 96160 PT-FOCUSED HLTH RISK ASSMT: CPT | Performed by: FAMILY MEDICINE

## 2025-07-08 PROCEDURE — 3074F SYST BP LT 130 MM HG: CPT | Performed by: FAMILY MEDICINE

## 2025-07-08 PROCEDURE — 36415 COLL VENOUS BLD VENIPUNCTURE: CPT

## 2025-07-08 PROCEDURE — 3008F BODY MASS INDEX DOCD: CPT | Performed by: FAMILY MEDICINE

## 2025-07-08 PROCEDURE — 3078F DIAST BP <80 MM HG: CPT | Performed by: FAMILY MEDICINE

## 2025-07-08 PROCEDURE — 80053 COMPREHEN METABOLIC PANEL: CPT

## 2025-07-08 PROCEDURE — G0402 INITIAL PREVENTIVE EXAM: HCPCS | Performed by: FAMILY MEDICINE

## 2025-07-08 RX ORDER — LISINOPRIL 2.5 MG/1
2.5 TABLET ORAL DAILY
COMMUNITY

## 2025-07-08 NOTE — PROGRESS NOTES
The following individual(s) verbally consented to be recorded using ambient AI listening technology and understand that they can each withdraw their consent to this listening technology at any point by asking the clinician to turn off or pause the recording:    Patient name: Lucy Wood  Additional names:

## 2025-07-08 NOTE — PROGRESS NOTES
Subjective:   Lucy Wood is a 64 year old female who presents for a MA AHA (Medicare Advantage Annual Health Assessment) and Initial Annual Wellness Visit (outside the first 12 months of Medicare eligibility, no prior AWV) and issues as below.   History of Present Illness  Lucy Wood is a 64 year old female who presents for a Medicare annual physical.    She was recently seen for hip pain due to trochanteric bursitis. She takes meloxicam as needed and performs stretching exercises, which improve her symptoms.    She has a history of squamous cell carcinoma on her face and continues with annual dermatological checks.    She has a history of stroke with short-term memory loss, persistent numbness and tingling on one side of her head. An appointment with a neuropsychologist is scheduled for September.    Her blood pressure is controlled with a low dose of lisinopril. She has no issues with asthma.    She has a left breast rash that is itchy but not painful, and has a history of eczema.    She underwent her last colonoscopy in 2018 and is willing to repeat the procedure despite disliking the preparation.    History/Other:   Fall Risk Assessment:   She has been screened for Falls and is low risk.      Cognitive Assessment:   Abnormal  What day of the week is this?: Incorrect  What month is it?: Correct  What year is it?: Correct  Recall \"Ball\": Correct  Recall \"Flag\": Correct  Recall \"Tree\": Correct    Functional Ability/Status:   Lucy Wood has some abnormal functions as listed below:  She has Driving difficulties based on screening of functional status. She has difficulties Taking Meds as Rx'd based on screening of functional status. She has Hearing problems based on screening of functional status.She has problems with Memory based on screening of functional status.       Depression Screening (PHQ):  PHQ-2 SCORE: 0  , done 7/8/2025   Last [place] Suicide Screening on 7/8/2025 was No Risk.           Advanced Directives:   She does NOT have a Living Will. [Do you have a living will?: (Patient-Rptd) No]  She does NOT have a Power of  for Health Care. [Do you have a healthcare power of ?: (Patient-Rptd) No]  Discussed Advance Care Planning with patient (and family/surrogate if present). Standard forms made available to patient in After Visit Summary.      Problem List[1]  Allergies:  She has no known allergies.    Current Medications:  Active Meds, Sig Only[2]    Medical History:  She  has a past medical history of Asthma (HCC), Colon polyp (06/2018), High blood pressure, Left knee pain (04/2012), and SCC (squamous cell carcinoma), face (11/16/2022).  Surgical History:  She  has a past surgical history that includes colonoscopy (2005); colonoscopy (03/03/2011); excise hand/foot neuroma (Left, 06/01/2015); and colonoscopy (N/A, 6/27/2018).   Family History:  Her family history includes Abdominal aortic aneurysm in her brother; Cancer in her father, mother, and paternal grandmother; Colon Cancer in her paternal grandmother; Diabetes in an other family member; Heart Disease in her father; Heart Surgery in her father; Polyps in her brother and father; Prostate Cancer in her father; Thyroid Disorder in her mother.  Social History:  She  reports that she quit smoking about 41 years ago. Her smoking use included cigarettes. She started smoking about 51 years ago. She has a 10 pack-year smoking history. She has never used smokeless tobacco. She reports current alcohol use. She reports that she does not use drugs.    Tobacco:  She smoked tobacco in the past but quit greater than 12 months ago.  Tobacco Use[3]     CAGE Alcohol Screen:   CAGE screening score of 0 on 7/6/2025, showing low risk of alcohol abuse.      Patient Care Team:  Lucy Zazueta MD as PCP - General (Family Medicine)    Review of Systems     Negative except see above    Objective:   Physical Exam  General Appearance:  Alert,  cooperative, no distress, appears stated age   Head:  Normocephalic, without obvious abnormality, atraumatic   Eyes:  PERRL, conjunctiva/corneas clear, EOM's intact both eyes   Ears:  Normal TM's and external ear canals, both ears   Nose: Nares normal, septum midline,mucosa normal, no drainage or sinus tenderness   Throat: Lips, mucosa, and tongue normal; teeth and gums normal   Neck: Supple, symmetrical, trachea midline, no adenopathy;  thyroid: not enlarged, symmetric, no tenderness/mass/nodules; no carotid bruit or JVD   Back:   Symmetric, no curvature, ROM normal, no CVA tenderness   Lungs:   Clear to auscultation bilaterally, respirations unlabored   Heart:  Regular rate and rhythm with occasional irregular beat, S1 and S2 normal, no murmur, rub, or gallop   Abdomen:   Soft, non-tender, bowel sounds active all four quadrants,  no masses, no organomegaly   Pelvic: Deferred   Extremities: Extremities normal, atraumatic, no cyanosis or edema   Pulses: 2+ and symmetric   Skin: Skin color, texture, turgor normal, no rashes or lesions   Lymph nodes: Cervical, supraclavicular, and axillary nodes normal   Neurologic: Short-term memory loss, loss of hearing left ear    and Breasts:  Left breast with excoriated papular rash just lateral to the nipple.  Otherwise normal appearance bilaterally.  No masses bilaterally    /76   Pulse 63   Ht 5' 6\" (1.676 m)   Wt 139 lb (63 kg)   LMP  (LMP Unknown)   SpO2 96%   BMI 22.44 kg/m²  Estimated body mass index is 22.44 kg/m² as calculated from the following:    Height as of this encounter: 5' 6\" (1.676 m).    Weight as of this encounter: 139 lb (63 kg).    Medicare Hearing Assessment:   Hearing Screening    Time taken: 7/8/2025  1:15 PM  Entry User: Konoz Jenna  Screening Method: Finger Rub  Finger Rub Result: Pass         Visual Acuity:   Right Eye Visual Acuity: Corrected Right Eye Chart Acuity: 20/20   Left Eye Visual Acuity: Corrected Left Eye Chart Acuity:  20/25   Both Eyes Visual Acuity: Corrected Both Eyes Chart Acuity: 20/20   Able To Tolerate Visual Acuity: Yes        Assessment & Plan:   Lucy Wood is a 64 year old female who presents for a Medicare Assessment.     1. Encounter for annual health examination (Primary)  2. Visit for screening mammogram  -     [facility] CAMRYN 2D+3D SCREENING BILAT (CPT=77067/35645); Future; Expected date: 07/08/2025  3. Hypertension, benign  -     Lipid Panel; Future; Expected date: 07/08/2025  -     CBC, Platelet; No Differential; Future; Expected date: 07/08/2025  -     Comp Metabolic Panel (14); Future; Expected date: 07/08/2025  4. Mild intermittent asthma without complication (HCC)  5. History of ischemic cerebrovascular accident (CVA) with residual deficit  Overview:  11/2023- Short-term memory deficit.  Neurology Tashia Seo MD   Right sided PCA territory infarct.  MRA revealed P1-P2 occlusion, and severe narrowing left vertebral artery origin.  4/2024 neurosurgery, Dr. Canales, assessment of 4 x 4 mm right-sided supraclinoid carotid artery aneurysmbilateral PCA.  Short-term memory deficit.  Neurology Tashia Seo MD [place]  Orders:  -     Neuropsychology Referral Benedicto Wen  6. Family history of colonic polyps  Overview:  Father and brother, colon cancer grandmother.  Colonoscopy 2013, 2018 repeat 5 year  7. Tortuous colon  8. Short-term memory loss  -     Neuropsychology Referral Benedicto Wen  9. Colon cancer screening  -     [facility] GI Telephone Colon Screen; Future; Expected date: 07/15/2025  10. Hearing loss of left ear, unspecified hearing loss type  -     OP REFERRAL TO AUDIOLOGY  -     ENT - INTERNAL  11. NSVT (nonsustained ventricular tachycardia) (HCC)  Overview:   [facility] 12/2023: 4 runs of NSVT, longest lasting 7 beats. 20 runs of SVT (atrial tachycardia), longest lasting 16 beats. No symptoms reported.   Assessment & Plan  Trochanteric Bursitis  Symptoms improved with anti-inflammatory  medication and exercises. Meloxicam prescribed. Discussed steroid injection if symptoms persist or worsen. Emphasized stretching exercises to prevent recurrence.  - Continue meloxicam as needed, with food, avoid combining with ibuprofen or naproxen.  - Perform stretching exercises regularly.  - Consider steroid injection if symptoms persist or worsen.    History of cerebrovascular Accident (CVA)  Residual asymmetrical hearing loss and numbness/tingling. Follow-up with neurologist in May.  Continue blood pressure, cholesterol control.  Daily aspirin.  Referral to neuropsychologist delayed until September. Discussed need for audiology and ENT consultation for hearing loss assessment.  - Refer to neuropsychologist for evaluation.  - Schedule audiology and ENT consultation to assess asymmetrical hearing loss.    Essential Hypertension  Blood pressure slightly elevated. Previously well-controlled with lisinopril. No home monitoring reported.  - Recheck blood pressure before leaving the office.  - Consider home blood pressure monitoring if needed.    General Health Maintenance  Routine health maintenance discussed. Overdue for colonoscopy with family history of colon cancer. Prefers colonoscopy despite unpleasant prep. Mammogram due. Bone density screening deferred for one year.  - Schedule colonoscopy, starting with a phone call with GI nurses.  - Schedule mammogram.  - Defer bone density screening for one year.    Goals of Care  Discussed healthcare power of  and advance directives. Prefers not to prolong life unnecessarily, open to flexible decision-making. Desires to avoid life-sustaining measures if recovery is not expected.  - Ensure healthcare power of  document is up to date.  - Discuss advance directives with family.  The patient indicates understanding of these issues and agrees to the plan.  Further testing ordered.  Imaging studies ordered.  Lab work ordered.  Reinforced healthy diet,  lifestyle, and exercise.      Return in about 6 months (around 1/8/2026) for hypertension.     Lucy Zazueta MD, 7/8/2025     Supplementary Documentation:   General Health:  In the past six months, have you lost more than 10 pounds without trying?: (Patient-Rptd) 2 - No  Has your appetite been poor?: (Patient-Rptd) No  Type of Diet: (Patient-Rptd) Balanced  How does the patient maintain a good energy level?: (Patient-Rptd) Daily Walks, Stretching  How would you describe your daily physical activity?: (Patient-Rptd) Moderate  How would you describe your current health state?: (Patient-Rptd) Good  How do you maintain positive mental well-being?: (Patient-Rptd) Social Interaction, Puzzles, Games, Visiting Friends, Visiting Family  On a scale of 0 to 10, with 0 being no pain and 10 being severe pain, what is your pain level?: (Patient-Rptd) 1 - (Mild)  In the past six months, have you experienced urine leakage?: (Patient-Rptd) 0-No  At any time do you feel concerned for the safety/well-being of yourself and/or your children, in your home or elsewhere?: No  Have you had any immunizations at another office such as Influenza, Hepatitis B, Tetanus, or Pneumococcal?: (Patient-Rptd) No    Health Maintenance   Topic Date Due    Colorectal Cancer Screening  06/27/2023    DEXA Scan  11/11/2024    Annual Well Visit  Never done    Mammogram  04/18/2025    COVID-19 Vaccine (6 - 2024-25 season) 05/13/2025    HTN: BP Follow-Up  08/08/2025    Influenza Vaccine (1) 10/01/2025    Pap Smear  01/09/2029    Annual Depression Screening  Completed    Fall Risk Screening (Annual)  Completed    Pneumococcal Vaccine: 50+ Years  Completed    Zoster Vaccines  Completed    Meningococcal B Vaccine  Aged Out            [1]   Patient Active Problem List  Diagnosis    Asthma (HCC)    Hypertension, benign    Family history of colonic polyps    Tortuous colon    History of ischemic cerebrovascular accident (CVA) with residual deficit    History of  squamous cell carcinoma of skin    NSVT (nonsustained ventricular tachycardia) (HCC)   [2]   Outpatient Medications Marked as Taking for the 25 encounter (Office Visit) with Lucy Zazueta MD   Medication Sig    lisinopril 2.5 MG Oral Tab Take 1 tablet (2.5 mg total) by mouth daily.    Meloxicam 15 MG Oral Tab Take 1 tablet (15 mg total) by mouth daily.    atorvastatin 40 MG Oral Tab Take 1 tablet (40 mg total) by mouth nightly.    aspirin 81 MG Oral Tab EC Take 1 tablet (81 mg total) by mouth daily.   [3]   Social History  Tobacco Use   Smoking Status Former    Current packs/day: 0.00    Average packs/day: 1 pack/day for 10.0 years (10.0 ttl pk-yrs)    Types: Cigarettes    Start date: 1974    Quit date: 1984    Years since quittin.2   Smokeless Tobacco Never

## 2025-07-21 ENCOUNTER — NURSE ONLY (OUTPATIENT)
Facility: CLINIC | Age: 65
End: 2025-07-21

## 2025-07-21 DIAGNOSIS — Z12.11 COLON CANCER SCREENING: Primary | ICD-10-CM

## 2025-07-21 DIAGNOSIS — Z80.0 FAMILY HISTORY OF COLON CANCER: ICD-10-CM

## 2025-07-21 DIAGNOSIS — Z86.0100 HISTORY OF COLON POLYPS: ICD-10-CM

## 2025-07-21 NOTE — PROGRESS NOTES
Scheduled for: Colonoscopy [address]11    Provider Name:  Dr Drummond    Date:  11/18/2025 (scheduled at [facility] due to Cardiac Loop Recorder)    Location:    [facility]    Sedation:  MAC    Prep:  Golytely    Meds/Allergies Reconciled?:  Physician reviewed     Diagnosis with codes:    Colon cancer screening [Z12.11]  History of colon polyps [Z86.0100]  Family history of colon cancer [Z80.0]    Was patient informed to call insurance with codes (Y/N):  Yes, I confirmed [facility] insurance with the patient.    Advised Patient: Please be sure to advise our office of any insurance changes as soon as possible to avoid possible cancellation of procedure      Referral sent?:  N/A    EM or [facility] notified?:  I sent an electronic request to Endo Scheduling and received a confirmation today.      Medication Orders:  This patient verbally confirmed that she is not taking:    Iron, blood thinners, and is not diabetic    No latex allergy, No PCN allergy and does not have a pacemaker    Patient has implanted Cardiac Loop Recorder     Misc Orders:    Hold Aspirin 4 days prior to procedure  Hold Lisinopril the day of the procedure     Further instructions given by staff:   I discussed the prep instructions with the patient which she verbally understood and is aware that I will send the instructions today via [employer].    Advised patient:    You will not be able to drive, operate machinery or make critical decisions the day of your procedure. Please make arrangements for transportation. You must have a  (age 18 or older) to accompany you, stay in the facility for the duration of your procedure and drive you home after the procedure.  You cannot use public transportation ([employer], Lyft, Taxi). The procedure involves sedation, and you will not be allowed to leave unaccompanied. Your procedure will not proceed forward if you're unable to confirm your  planned to escort you home.

## 2025-07-21 NOTE — PROGRESS NOTES
GI Staff:  TCS Colon Screening Orders    Please schedule: Colonoscopy [id number] with KAYLA NAGY preference: ELOISE Drummond MD     Please send split dose Golytely bowel prep     Diagnosis:   Colon Screening Z12.11   History of Colon polyps  Z86.010   Family history of colon cancer Z80.0     Medication adjustments:    Lisinopril Do not take the Night Before or Day of procedure  Hold Aspirin 4 days prior        >>>Please inform patient if new medications are started after scheduling procedure they need to call clinic to notify us.

## 2025-07-21 NOTE — PROGRESS NOTES
Called patient for scheduled TCS/FIT+ result.   Medications, pharmacy, and allergies reviewed.   Please advise on colonoscopy and bowel prep orders.     Age 45-74 y/o:   › MD preference: ELOISE Drummond MD  › Insurance:  [facility]   › Last PCP visit: 7/8/2025  Pcp within [facility]: Lucy Zazueta MD   › Last CBC: 7/8/2025  › Date of positive FIT: n/a  › H/W/BMI: 66 in / 139 lbs / 22.44 kg/m²     Special comments/notes:  Recall    Last Procedure, Date, MD:    06/27/2018 Colonoscopy with Dr Drummond    Last diagnosis:  Colon screen/ colon polyps    Recalled for (mth/yrs):  5 years    Sedation used previously:  MAC    Last Prep Used: Split Suprep    Quality of Prep:      Telephone Colon Screening Questionnaire Yes No   Are you currently experiencing any new/abnormal GI symptoms? [] [x]   If yes, explain:                              Rectal bleeding?            [] [x]   Black stool?              [] [x]   Dysphagia or food \"feeling stuck\" when eating?    [] [x]   Intractable vomiting?          [] [x]   Unexplained weight loss?                        [] [x]   First colonoscopy?                         [] [x]   Family history of colon cancer?     Paternal Grandmother       [x] []   Any issues with anesthesia?                   [] [x]   If yes, explain:                                   Any recent complaints of chest pain or shortness of breath?  [] [x]   Referred to a cardiologist?  [] [x]   If yes, explain:             Stroke, MI (heart attack) or stent placement in the last 12 months:  [] [x]   History of  respiratory issues/oxygen/ALICIA/COPD: [] [x]   If yes, CPAP/BiPAP:                                    History of devices (pacemaker/defibrillator) [] [x]   History of cardiac/CVA/(MI/stroke):           [] [x]     Medication Reconciliation  Yes  No   Anticoagulation                                 [] [x]   Diuretics                                          [] [x]   ACE/ARB's/Combo       Lisinopril                                 [x] []   Diabetic medication (oral/insulin)                           [] [x]   Weight loss medication (phentermine/vyvanse/saxsenda)               [] [x]   Iron/vitamin E/herbal/multivitamin supplements                            [] [x]   NSAID/ASA   (ex: aspirin, Ibuprofen, naproxen, meloxicam, ketorolac, celecoxib, sulindac, indomethacin)  [x] []   Marijuana/CBD/vaping use                                  [] [x]

## 2025-08-06 ENCOUNTER — HOSPITAL ENCOUNTER (OUTPATIENT)
Dept: MAMMOGRAPHY | Age: 65
Discharge: HOME | End: 2025-08-06
Attending: FAMILY MEDICINE

## 2025-08-06 DIAGNOSIS — Z12.31 VISIT FOR SCREENING MAMMOGRAM: ICD-10-CM

## 2025-08-06 PROCEDURE — 77063 BREAST TOMOSYNTHESIS BI: CPT | Performed by: FAMILY MEDICINE

## 2025-08-06 PROCEDURE — 77067 SCR MAMMO BI INCL CAD: CPT | Performed by: FAMILY MEDICINE

## (undated) DIAGNOSIS — Z01.818 PREOP EXAMINATION: ICD-10-CM

## (undated) DIAGNOSIS — G57.61 MORTON NEUROMA, RIGHT: ICD-10-CM

## (undated) DEVICE — ENDOSCOPY PACK - LOWER: Brand: MEDLINE INDUSTRIES, INC.

## (undated) DEVICE — TRAP 4 CPTR CHMBR N EZ INLN

## (undated) DEVICE — SNARE CAPTIFLEX MICRO-OVL OLY

## (undated) DEVICE — Device: Brand: DEFENDO AIR/WATER/SUCTION AND BIOPSY VALVE

## (undated) NOTE — Clinical Note
2/21/2017              Martin Ye        [address] [address]         Dear [facility],    It was a pleasure to see you. Your mammogram was normal.  There is no need for further testing at this time.   I look forward to seeing you at [facility]

## (undated) NOTE — LETTER
Date & Time: 4/25/2018, 12:59 PM  Patient: Noris Vaz  Encounter Provider(s):    Hortencia Vazquez DO       To Whom It May Concern: Noris Vaz was seen and treated in our department on 4/25/2018. She should not return to work until 5/2/18.

## (undated) NOTE — LETTER
7/23/2020          To Whom It May Concern: Tonja Bean is currently under my medical care. Due to medical issues she should not return to work 7/24/2020. Return to work date to be determined when results of testing available.     If you require add

## (undated) NOTE — LETTER
5/8/2023    [address] [facility] [address]            Dear Steph Rose,      Our records indicate that you are due for an appointment for a Colonoscopy with Velvet Cohen MD. Our doctors are booking out about 3-5 months in advance for procedures. Please call our office to schedule a phone screening appointment to plan for the procedure(s). Your medical well-being is important to us. If your insurance requires a referral, please call your primary care office to request one.       Thank you,      The Physicians and Staff at [facility]

## (undated) NOTE — LETTER
Nola Seo MD - 01/03/2024 11:00 AM CST  Formatting of this note is different from the original.  Images from the original note were not included.  Department of Neurological Sciences  Vascular Neurology  New/Return/Comp: New Outpatient Patient Visit    HISTORY OF PRESENT ILLNESS:  Lucy Wood is a 63 year old L handed female with past medical history significant for HTN, L and R PCA infarcts (11/2023) who presents to the stroke clinic for hospital follow up.    Prior to the strokes, was regularly seeing doctors and did not take any medications. Was keeping up to date with regular cancer screenings. Prior to strokes, denies any trauma, neck manipulation, headache, neck pain, weight loss. Of note, about 1 month prior to strokes, patient states that she did have COVID.    Stroke History  Patient was admitted on 11/25/23 after presenting with R face, arm, leg sensory changes associated with intermittent dizziness. NIHSS on arrival was 1 (1-sensory).  Intravenous thrombolysis treatment was not administered: IV thrombolytic therapy was considered and not given due to violations in inclusion criteria including mild/rapidly resolving deficits.  Endovascular Procedure was not performed: deficits too mild  Admission NIHSS was 1 (1-sensory).  Admission mRS was not documented.    Hospital course was complicated by worsening neuro exam day after tele stroke was called. NIHSS increased from 1 to 8 (1-?s, 2-commands, 1-VF, 1-LUE, 1- ataxia, 1-sensory, 1-extinction) the following day as per Dr. Villanueva consult note. Per notes, exam change may have been associated with a drop in BP. CTA HN from prior to exam change notable for L P1/P2 jxn occlusion, patent R PCA, severe narrowing at L vert origin (athero plaque vs dissection), hypoplastic R vert, and 4x4mm R supraclinoid carotid artery aneurysm. Repeat MRI and vessel imaging was obtained, and plavix 75 was added to [id number]. MRI revealed new R PCA territory infarct with  occluded distal R PCA. MRA neck with severe stenosis vs occlusion within distal basilar; L V1/V2 occluded, no comment on dissection.    Patient was ultimately diagnosed with Ischemic Stroke:  Location: L and R PCA  IV thrombolysis? No  Intervention? No  Hemorrhagic Transformation? No  Etiology: Embolic Stroke of Undetermined Source and discharged to IR[facility] (Inpatient Rehab Facility) on 12/3/23  NIHSS on disch/arge - not documented  mRS on discharge was 4 - Moderate severe disability: unable to walk or attend to own bodily needs without assistance.    Since hospitalization, Lucy Wood reports no seizures, pneumonia, and limb contractures. At night when tired, more confused and sad, \"weepy\".  thinks it's getting a little better. Still having tingling in bilateral hands. L>R including legs. No bothersome weakness. Has been concentrating more with writing with her L hand but is still able to do so. Has been doing outpatient rehab. Reporting memory issues since the hospital, sometimes feels like she doesn't recognize [place], has not been having issues with remembering family members. When she is stressed, has a hard time concentrating on tasks and concentrations, lots of anxiety \"panic attacks\".  NIHSS today 1 (1-partial VF)  mRS today 1 - No significant disability: despite symptoms, able to carry out all usual duties and activities.

## (undated) NOTE — LETTER
[facility], [address] [address] [phone]                06/30/18      [place] [address] [address] [place] [address]        Dear [facility],    I reviewed the pathology report from th

## (undated) NOTE — LETTER
Lakisha Lepe, [address] [facility]  [address] [place] [address]  [place]       05/02/22        Patient: Fely Simon   YOB: 1960   Date of Visit: 5/2/2022       Dear  Dr. Drake Patterson MD,      Thank you for referring Fely Simon to my practice. Please find my assessment and plan below. ASSESSMENT AND PLAN    1. Tinnitus of both ears  We reviewed her hearing test which demonstrates an asymmetry in hearing with a mild hearing loss at all frequencies on the right and a moderate hearing loss at all frequencies on the left. She is retiring from being a  overnight. We discussed the fact that she has had this hearing loss essentially since she was a preteen and that she is noted no significant change in her hearing over time. The ringing has only been present for about a year and she asked whether this could be due to COVID infection or vaccinations I did discuss with her that we have seen an increased number of episodes of post infection and postvaccination tinnitus and hearing loss but this is usually unilateral.  The fact that she has a tinnitus in both ears I suspect is probably more likely due to her underlying hearing loss. We discussed masking techniques as well as use of hearing aids to help with her tinnitus. She will consider these options and return to see me on a yearly basis for repeat audiograms. Sincerely,   Jonathan Anderson. Landon Hart MD   [address] [place] [address]  [address] [facility] [address] [id number]    Document electronically generated by:  Jonathan Anderson.  Landon Hart MD

## (undated) NOTE — LETTER
18      Patient: Manas Gonzales  : 1960 Visit date: 2018    Dear Lorenza Arnold,      I examined your patient in consultation today.     She has significant left thumb trapeziometacarpal arthritis which has been asymptomatic until 1

## (undated) NOTE — LETTER
6/25/2018          To Whom It May Concern: Lovely Mckeon is currently under my medical care. Please excuse Joanne Plummer for 2 days (6/26/18-6/27/18) for her procedure. She may return to work on 6/28/18. Activity is restricted as follows: none.     If you